# Patient Record
Sex: FEMALE | Race: BLACK OR AFRICAN AMERICAN | NOT HISPANIC OR LATINO | URBAN - METROPOLITAN AREA
[De-identification: names, ages, dates, MRNs, and addresses within clinical notes are randomized per-mention and may not be internally consistent; named-entity substitution may affect disease eponyms.]

---

## 2023-08-20 ENCOUNTER — INPATIENT (INPATIENT)
Facility: HOSPITAL | Age: 73
LOS: 4 days | Discharge: HOME | End: 2023-08-25
Attending: INTERNAL MEDICINE | Admitting: INTERNAL MEDICINE
Payer: MEDICAID

## 2023-08-20 VITALS
RESPIRATION RATE: 16 BRPM | TEMPERATURE: 98 F | DIASTOLIC BLOOD PRESSURE: 71 MMHG | HEART RATE: 110 BPM | SYSTOLIC BLOOD PRESSURE: 151 MMHG | OXYGEN SATURATION: 100 %

## 2023-08-20 LAB
ALBUMIN SERPL ELPH-MCNC: 4.7 G/DL — SIGNIFICANT CHANGE UP (ref 3.3–5)
ALP SERPL-CCNC: 138 U/L — HIGH (ref 40–120)
ALT FLD-CCNC: 32 U/L — SIGNIFICANT CHANGE UP (ref 4–33)
ANION GAP SERPL CALC-SCNC: 12 MMOL/L — SIGNIFICANT CHANGE UP (ref 7–14)
AST SERPL-CCNC: 36 U/L — HIGH (ref 4–32)
BASOPHILS # BLD AUTO: 0.03 K/UL — SIGNIFICANT CHANGE UP (ref 0–0.2)
BASOPHILS NFR BLD AUTO: 0.4 % — SIGNIFICANT CHANGE UP (ref 0–2)
BILIRUB SERPL-MCNC: 0.3 MG/DL — SIGNIFICANT CHANGE UP (ref 0.2–1.2)
BUN SERPL-MCNC: 18 MG/DL — SIGNIFICANT CHANGE UP (ref 7–23)
CALCIUM SERPL-MCNC: 10.6 MG/DL — HIGH (ref 8.4–10.5)
CHLORIDE SERPL-SCNC: 101 MMOL/L — SIGNIFICANT CHANGE UP (ref 98–107)
CO2 SERPL-SCNC: 28 MMOL/L — SIGNIFICANT CHANGE UP (ref 22–31)
CREAT SERPL-MCNC: 0.81 MG/DL — SIGNIFICANT CHANGE UP (ref 0.5–1.3)
EGFR: 76 ML/MIN/1.73M2 — SIGNIFICANT CHANGE UP
EOSINOPHIL # BLD AUTO: 0.03 K/UL — SIGNIFICANT CHANGE UP (ref 0–0.5)
EOSINOPHIL NFR BLD AUTO: 0.4 % — SIGNIFICANT CHANGE UP (ref 0–6)
GLUCOSE SERPL-MCNC: 140 MG/DL — HIGH (ref 70–99)
HCT VFR BLD CALC: 36.9 % — SIGNIFICANT CHANGE UP (ref 34.5–45)
HGB BLD-MCNC: 11.8 G/DL — SIGNIFICANT CHANGE UP (ref 11.5–15.5)
IANC: 4.06 K/UL — SIGNIFICANT CHANGE UP (ref 1.8–7.4)
IMM GRANULOCYTES NFR BLD AUTO: 0.3 % — SIGNIFICANT CHANGE UP (ref 0–0.9)
LIDOCAIN IGE QN: 32 U/L — SIGNIFICANT CHANGE UP (ref 7–60)
LYMPHOCYTES # BLD AUTO: 2.1 K/UL — SIGNIFICANT CHANGE UP (ref 1–3.3)
LYMPHOCYTES # BLD AUTO: 31.5 % — SIGNIFICANT CHANGE UP (ref 13–44)
MCHC RBC-ENTMCNC: 29.6 PG — SIGNIFICANT CHANGE UP (ref 27–34)
MCHC RBC-ENTMCNC: 32 GM/DL — SIGNIFICANT CHANGE UP (ref 32–36)
MCV RBC AUTO: 92.5 FL — SIGNIFICANT CHANGE UP (ref 80–100)
MONOCYTES # BLD AUTO: 0.43 K/UL — SIGNIFICANT CHANGE UP (ref 0–0.9)
MONOCYTES NFR BLD AUTO: 6.4 % — SIGNIFICANT CHANGE UP (ref 2–14)
NEUTROPHILS # BLD AUTO: 4.06 K/UL — SIGNIFICANT CHANGE UP (ref 1.8–7.4)
NEUTROPHILS NFR BLD AUTO: 61 % — SIGNIFICANT CHANGE UP (ref 43–77)
NRBC # BLD: 0 /100 WBCS — SIGNIFICANT CHANGE UP (ref 0–0)
NRBC # FLD: 0 K/UL — SIGNIFICANT CHANGE UP (ref 0–0)
PLATELET # BLD AUTO: 191 K/UL — SIGNIFICANT CHANGE UP (ref 150–400)
POTASSIUM SERPL-MCNC: 3.6 MMOL/L — SIGNIFICANT CHANGE UP (ref 3.5–5.3)
POTASSIUM SERPL-SCNC: 3.6 MMOL/L — SIGNIFICANT CHANGE UP (ref 3.5–5.3)
PROT SERPL-MCNC: 8.1 G/DL — SIGNIFICANT CHANGE UP (ref 6–8.3)
RBC # BLD: 3.99 M/UL — SIGNIFICANT CHANGE UP (ref 3.8–5.2)
RBC # FLD: 13.3 % — SIGNIFICANT CHANGE UP (ref 10.3–14.5)
SODIUM SERPL-SCNC: 141 MMOL/L — SIGNIFICANT CHANGE UP (ref 135–145)
WBC # BLD: 6.67 K/UL — SIGNIFICANT CHANGE UP (ref 3.8–10.5)
WBC # FLD AUTO: 6.67 K/UL — SIGNIFICANT CHANGE UP (ref 3.8–10.5)

## 2023-08-20 PROCEDURE — 99285 EMERGENCY DEPT VISIT HI MDM: CPT

## 2023-08-20 RX ORDER — ONDANSETRON 8 MG/1
4 TABLET, FILM COATED ORAL ONCE
Refills: 0 | Status: COMPLETED | OUTPATIENT
Start: 2023-08-20 | End: 2023-08-20

## 2023-08-20 RX ORDER — FAMOTIDINE 10 MG/ML
20 INJECTION INTRAVENOUS ONCE
Refills: 0 | Status: COMPLETED | OUTPATIENT
Start: 2023-08-20 | End: 2023-08-20

## 2023-08-20 RX ORDER — SODIUM CHLORIDE 9 MG/ML
1000 INJECTION INTRAMUSCULAR; INTRAVENOUS; SUBCUTANEOUS ONCE
Refills: 0 | Status: COMPLETED | OUTPATIENT
Start: 2023-08-20 | End: 2023-08-20

## 2023-08-20 RX ORDER — KETOROLAC TROMETHAMINE 30 MG/ML
15 SYRINGE (ML) INJECTION ONCE
Refills: 0 | Status: DISCONTINUED | OUTPATIENT
Start: 2023-08-20 | End: 2023-08-20

## 2023-08-20 RX ADMIN — FAMOTIDINE 20 MILLIGRAM(S): 10 INJECTION INTRAVENOUS at 21:41

## 2023-08-20 RX ADMIN — SODIUM CHLORIDE 1000 MILLILITER(S): 9 INJECTION INTRAMUSCULAR; INTRAVENOUS; SUBCUTANEOUS at 21:41

## 2023-08-20 RX ADMIN — Medication 30 MILLILITER(S): at 21:41

## 2023-08-20 RX ADMIN — Medication 15 MILLIGRAM(S): at 21:40

## 2023-08-20 RX ADMIN — ONDANSETRON 4 MILLIGRAM(S): 8 TABLET, FILM COATED ORAL at 21:41

## 2023-08-20 NOTE — ED PROVIDER NOTE - CLINICAL SUMMARY MEDICAL DECISION MAKING FREE TEXT BOX
73 y/o F with PMH of non-insulin DM, HTN, and HLD presents c/o 2 episodes of NBNB N/V today. Patient with tenderness on exam so will need to be ruled out for underlying abdominal pathology. Fingerstick on arrival is 200 so less likely symptoms are related to DKA but will reassess based on blood work. Labs, UA, EKG, CT abd/pelvis with contrast ordered. Fluids, toradol, zofran, pepcid, and maalox ordered as treatment. 73 y/o F with PMH of non-insulin DM, HTN, and HLD presents c/o 2 episodes of NBNB N/V today. Patient with tenderness on exam so will need to be ruled out for underlying abdominal pathology. Fingerstick on arrival is 200 so less likely symptoms are related to DKA but will reassess based on blood work. Labs, UA, EKG, CT abd/pelvis with contrast ordered. Fluids, toradol, zofran, pepcid, and maalox ordered as treatment.  Labs show change in troponin level from 15>9 which is a significant change that may indicate downtrending from acute cardiac event. Rest of labs wnl.  EKG was NS @ 77 without evidence of ST elevations or depression, no t-wave inversions, or other concerning ischemic signs.  CT was unremarkable for acute findings.  Patient feeling better throughout ED visit, but considering patients change in troponin, presenting incident, and her medical history, will admit to tele service for continued cardiac monitoring and care.  Discussed the case with the cardiology team and patient was accepted for admission under Dr. Lay.

## 2023-08-20 NOTE — ED ADULT NURSE NOTE - OBJECTIVE STATEMENT
Patient came in with the complaints of abdominal discomfort with nausea, vomiting and diarrhea. No other complaints. No distress noted. Specimens collected and sent. Medications given as ordered. Patient tolerated well. Nursing care continues

## 2023-08-20 NOTE — ED PROVIDER NOTE - ATTENDING APP SHARED VISIT CONTRIBUTION OF CARE
I performed a face-to-face evaluation of the patient and performed a history and physical examination along with the resident or ACP, and/or medical student above.  I agree with the history and physical examination as documented by the resident or ACP, and/or medical student above.  Sanchez:  CONSTITUTIONAL: Comfortable; in no acute distress. Non-toxic appearing.   NEURO: Alert & oriented. Sensory and motor functions are grossly intact.  PSYCH: Mood appropriate. Thought processes intact.   HEAD: NCAT  CARD: Regular rate and rhythm, no murmurs  RESP: No accessory muscle use; breath sounds clear and equal bilaterally; no wheezes, rhonchi, or rales     ABD: Soft; (+) RLQ tenderness to palpation; non-distended. No guarding or rebound.   MUSCULOSKELETAL/EXTREMITIES: FROM in all four extremities; no extremity edema.  SKIN: Warm; dry; no apparent lesions or exudate

## 2023-08-20 NOTE — ED PROVIDER NOTE - PHYSICAL EXAMINATION
CONSTITUTIONAL: Comfortable; in no acute distress. Non-toxic appearing.   NEURO: Alert & oriented. Sensory and motor functions are grossly intact.  PSYCH: Mood appropriate. Thought processes intact.   HEAD: NCAT  CARD: Regular rate and rhythm, no murmurs  RESP: No accessory muscle use; breath sounds clear and equal bilaterally; no wheezes, rhonchi, or rales     ABD: Soft; (+) RLQ tenderness to palpation; non-distended. No guarding or rebound.   MUSCULOSKELETAL/EXTREMITIES: FROM in all four extremities; no extremity edema.  SKIN: Warm; dry; no apparent lesions or exudate

## 2023-08-20 NOTE — ED PROVIDER NOTE - OBJECTIVE STATEMENT
73 y/o F with PMH of non-insulin DM, HTN, and HLD presents c/o 2 episodes of NBNB N/V today. Patient notes that for several months she was having issues with having a "bad feeling" in her abdomen that was a discomfort described more as nausea rather than overt pain. The discomfort was originally sporadic and would only last for a few minutes at a time with complete resolution after each incident so she did not pursue any medical evaluations or interventions for it prior to this visit. However, today the discomfort started at around 11am and has been constant since that time with associated 2 episodes of NBNB vomiting, lightheaded dizziness, shakiness, and pre-syncope prompting current visit. At time of exam patient endorses some improvement of of symptoms but slight residual discomfort. Of note, she did have her DM medications adjusted 3 weeks earlier with new medications added to her previous regimen but her sporadic symptoms were still going on before that point. Denies any other complaints or concerns. Denies chest pain, SOB, cough, fevers, chills, diarrhea, constipation, urinary complaints, HA, numbness, tingling, weakness, sick contacts, recent travel. 73 y/o F with PMH of non-insulin DM, HTN, and HLD presents c/o 2 episodes of NBNB N/V today. Patient notes that for several months she was having issues with having a "bad feeling" in her abdomen that was a discomfort described more as nausea rather than overt pain. The discomfort was originally sporadic and would only last for a few minutes at a time with complete resolution after each incident so she did not pursue any medical evaluations or interventions for it prior to this visit. However, today the discomfort started at around 11am and has been constant since that time with associated 2 episodes of NBNB vomiting, lightheaded dizziness, shakiness, and pre-syncope prompting current visit. At time of exam patient endorses some improvement of symptoms but slight residual discomfort. Of note, she did have her DM medications adjusted 3 weeks earlier with new medications added to her previous regimen but her sporadic symptoms were still going on before that point. Denies any other complaints or concerns. Denies chest pain, SOB, cough, fevers, chills, diarrhea, constipation, urinary complaints, HA, numbness, tingling, weakness, sick contacts, recent travel.

## 2023-08-20 NOTE — ED ADULT TRIAGE NOTE - CHIEF COMPLAINT QUOTE
Pt arrives via EMS from home c/o N/V since 11:00 AM. Endorses decreased appetite x1 yr. PMHx: DM, HTN.

## 2023-08-21 DIAGNOSIS — R10.13 EPIGASTRIC PAIN: ICD-10-CM

## 2023-08-21 LAB
ANION GAP SERPL CALC-SCNC: 13 MMOL/L — SIGNIFICANT CHANGE UP (ref 7–14)
APPEARANCE UR: CLEAR — SIGNIFICANT CHANGE UP
BACTERIA # UR AUTO: NEGATIVE /HPF — SIGNIFICANT CHANGE UP
BILIRUB UR-MCNC: NEGATIVE — SIGNIFICANT CHANGE UP
BUN SERPL-MCNC: 14 MG/DL — SIGNIFICANT CHANGE UP (ref 7–23)
CALCIUM SERPL-MCNC: 9.9 MG/DL — SIGNIFICANT CHANGE UP (ref 8.4–10.5)
CHLORIDE SERPL-SCNC: 101 MMOL/L — SIGNIFICANT CHANGE UP (ref 98–107)
CO2 SERPL-SCNC: 28 MMOL/L — SIGNIFICANT CHANGE UP (ref 22–31)
COLOR SPEC: YELLOW — SIGNIFICANT CHANGE UP
CREAT SERPL-MCNC: 0.79 MG/DL — SIGNIFICANT CHANGE UP (ref 0.5–1.3)
DIFF PNL FLD: NEGATIVE — SIGNIFICANT CHANGE UP
EGFR: 78 ML/MIN/1.73M2 — SIGNIFICANT CHANGE UP
GLUCOSE BLDC GLUCOMTR-MCNC: 128 MG/DL — HIGH (ref 70–99)
GLUCOSE BLDC GLUCOMTR-MCNC: 159 MG/DL — HIGH (ref 70–99)
GLUCOSE BLDC GLUCOMTR-MCNC: 61 MG/DL — LOW (ref 70–99)
GLUCOSE BLDC GLUCOMTR-MCNC: 76 MG/DL — SIGNIFICANT CHANGE UP (ref 70–99)
GLUCOSE BLDC GLUCOMTR-MCNC: 91 MG/DL — SIGNIFICANT CHANGE UP (ref 70–99)
GLUCOSE BLDC GLUCOMTR-MCNC: 96 MG/DL — SIGNIFICANT CHANGE UP (ref 70–99)
GLUCOSE BLDC GLUCOMTR-MCNC: 99 MG/DL — SIGNIFICANT CHANGE UP (ref 70–99)
GLUCOSE SERPL-MCNC: 145 MG/DL — HIGH (ref 70–99)
GLUCOSE UR QL: >=1000 MG/DL
HCT VFR BLD CALC: 37.5 % — SIGNIFICANT CHANGE UP (ref 34.5–45)
HGB BLD-MCNC: 11.6 G/DL — SIGNIFICANT CHANGE UP (ref 11.5–15.5)
KETONES UR-MCNC: NEGATIVE MG/DL — SIGNIFICANT CHANGE UP
LEUKOCYTE ESTERASE UR-ACNC: NEGATIVE — SIGNIFICANT CHANGE UP
MAGNESIUM SERPL-MCNC: 2.2 MG/DL — SIGNIFICANT CHANGE UP (ref 1.6–2.6)
MCHC RBC-ENTMCNC: 29.3 PG — SIGNIFICANT CHANGE UP (ref 27–34)
MCHC RBC-ENTMCNC: 30.9 GM/DL — LOW (ref 32–36)
MCV RBC AUTO: 94.7 FL — SIGNIFICANT CHANGE UP (ref 80–100)
NITRITE UR-MCNC: POSITIVE
NRBC # BLD: 0 /100 WBCS — SIGNIFICANT CHANGE UP (ref 0–0)
NRBC # FLD: 0 K/UL — SIGNIFICANT CHANGE UP (ref 0–0)
PH UR: 6.5 — SIGNIFICANT CHANGE UP (ref 5–8)
PHOSPHATE SERPL-MCNC: 3.4 MG/DL — SIGNIFICANT CHANGE UP (ref 2.5–4.5)
PLATELET # BLD AUTO: 188 K/UL — SIGNIFICANT CHANGE UP (ref 150–400)
POTASSIUM SERPL-MCNC: 3.9 MMOL/L — SIGNIFICANT CHANGE UP (ref 3.5–5.3)
POTASSIUM SERPL-SCNC: 3.9 MMOL/L — SIGNIFICANT CHANGE UP (ref 3.5–5.3)
PROT UR-MCNC: NEGATIVE MG/DL — SIGNIFICANT CHANGE UP
RBC # BLD: 3.96 M/UL — SIGNIFICANT CHANGE UP (ref 3.8–5.2)
RBC # FLD: 13.5 % — SIGNIFICANT CHANGE UP (ref 10.3–14.5)
RBC CASTS # UR COMP ASSIST: <5 /HPF — HIGH (ref 0–4)
SODIUM SERPL-SCNC: 142 MMOL/L — SIGNIFICANT CHANGE UP (ref 135–145)
SP GR SPEC: 1.02 — SIGNIFICANT CHANGE UP (ref 1–1.03)
TROPONIN T, HIGH SENSITIVITY RESULT: 9 NG/L — SIGNIFICANT CHANGE UP
UROBILINOGEN FLD QL: 1 MG/DL — SIGNIFICANT CHANGE UP (ref 0.2–1)
WBC # BLD: 5.28 K/UL — SIGNIFICANT CHANGE UP (ref 3.8–10.5)
WBC # FLD AUTO: 5.28 K/UL — SIGNIFICANT CHANGE UP (ref 3.8–10.5)
WBC UR QL: <5 /HPF — SIGNIFICANT CHANGE UP (ref 0–5)

## 2023-08-21 PROCEDURE — 74177 CT ABD & PELVIS W/CONTRAST: CPT | Mod: 26,MA

## 2023-08-21 RX ORDER — DEXTROSE 50 % IN WATER 50 %
12.5 SYRINGE (ML) INTRAVENOUS ONCE
Refills: 0 | Status: DISCONTINUED | OUTPATIENT
Start: 2023-08-21 | End: 2023-08-25

## 2023-08-21 RX ORDER — DEXTROSE 50 % IN WATER 50 %
25 SYRINGE (ML) INTRAVENOUS ONCE
Refills: 0 | Status: DISCONTINUED | OUTPATIENT
Start: 2023-08-21 | End: 2023-08-25

## 2023-08-21 RX ORDER — SODIUM CHLORIDE 9 MG/ML
1000 INJECTION, SOLUTION INTRAVENOUS
Refills: 0 | Status: DISCONTINUED | OUTPATIENT
Start: 2023-08-21 | End: 2023-08-25

## 2023-08-21 RX ORDER — INSULIN LISPRO 100/ML
VIAL (ML) SUBCUTANEOUS AT BEDTIME
Refills: 0 | Status: DISCONTINUED | OUTPATIENT
Start: 2023-08-21 | End: 2023-08-25

## 2023-08-21 RX ORDER — SODIUM CHLORIDE 9 MG/ML
1000 INJECTION INTRAMUSCULAR; INTRAVENOUS; SUBCUTANEOUS
Refills: 0 | Status: DISCONTINUED | OUTPATIENT
Start: 2023-08-21 | End: 2023-08-25

## 2023-08-21 RX ORDER — ENOXAPARIN SODIUM 100 MG/ML
40 INJECTION SUBCUTANEOUS EVERY 24 HOURS
Refills: 0 | Status: DISCONTINUED | OUTPATIENT
Start: 2023-08-21 | End: 2023-08-25

## 2023-08-21 RX ORDER — GLUCAGON INJECTION, SOLUTION 0.5 MG/.1ML
1 INJECTION, SOLUTION SUBCUTANEOUS ONCE
Refills: 0 | Status: DISCONTINUED | OUTPATIENT
Start: 2023-08-21 | End: 2023-08-25

## 2023-08-21 RX ORDER — INSULIN LISPRO 100/ML
VIAL (ML) SUBCUTANEOUS
Refills: 0 | Status: DISCONTINUED | OUTPATIENT
Start: 2023-08-21 | End: 2023-08-25

## 2023-08-21 RX ORDER — PANTOPRAZOLE SODIUM 20 MG/1
40 TABLET, DELAYED RELEASE ORAL
Refills: 0 | Status: DISCONTINUED | OUTPATIENT
Start: 2023-08-21 | End: 2023-08-25

## 2023-08-21 RX ORDER — DEXTROSE 50 % IN WATER 50 %
15 SYRINGE (ML) INTRAVENOUS ONCE
Refills: 0 | Status: DISCONTINUED | OUTPATIENT
Start: 2023-08-21 | End: 2023-08-25

## 2023-08-21 RX ADMIN — Medication 1: at 15:19

## 2023-08-21 RX ADMIN — SODIUM CHLORIDE 100 MILLILITER(S): 9 INJECTION INTRAMUSCULAR; INTRAVENOUS; SUBCUTANEOUS at 15:20

## 2023-08-21 RX ADMIN — ENOXAPARIN SODIUM 40 MILLIGRAM(S): 100 INJECTION SUBCUTANEOUS at 23:39

## 2023-08-21 NOTE — CONSULT NOTE ADULT - NS ATTEND AMEND GEN_ALL_CORE FT
Patient seen and examined. Agree with plan as detailed in PA/NP Note.     F/u TTE    Kelly Arroyo MD  Pager: [phone]

## 2023-08-21 NOTE — H&P ADULT - ASSESSMENT
71 y/o F with PMH of non-insulin DM, HTN, and HLD presents c/o 2 episodes of NBNB N/V today. Patient notes that for several months she was having issues with having a "bad feeling" in her abdomen that was a discomfort described more as nausea rather than overt pain. The discomfort was originally sporadic and would only last for a few minutes at a time with complete resolution after each incident so she did not pursue any medical evaluations or interventions for it prior to this visit. However, today the discomfort started at around 11am and has been constant since that time with associated 2 episodes of NBNB vomiting, lightheaded dizziness, shakiness, and pre-syncope prompting current visit. At time of exam patient endorses some improvement of of symptoms but slight residual discomfort. Of note, she did have her DM medications adjusted 3 weeks earlier with new medications added to her previous regimen but her sporadic symptoms were still going on before that point. Denies any other complaints or concerns. Denies chest pain, SOB, cough, fevers, chills, diarrhea, constipation, urinary complaints, HA, numbness, tingling, weakness, sick contacts, recent travel. nausea and vomiting   71 y/o F with PMH of non-insulin DM, HTN, and HLD presents c/o 2 episodes of NBNB N/V today. Patient notes that for several months she was having issues with having a "bad feeling" in her abdomen that was a discomfort described more as nausea rather than overt pain. The discomfort was originally sporadic and would only last for a few minutes at a time with complete resolution after each incident so she did not pursue any medical evaluations or interventions for it prior to this visit. However, today the discomfort started at around 11am and has been constant since that time with associated 2 episodes of NBNB vomiting, lightheaded dizziness, shakiness, and pre-syncope prompting current visit. At time of exam patient endorses some improvement of of symptoms but slight residual discomfort. Of note, she did have her DM medications adjusted 3 weeks earlier with new medications added to her previous regimen but her sporadic symptoms were still going on before that point. Denies any other complaints or concerns. Denies chest pain, SOB, cough, fevers, chills, diarrhea, constipation, urinary complaints, HA, numbness, tingling, weakness, sick contacts, recent travel. nausea and vomiting     1 Chest pain  - telemonitor  - cards fu   - ro ACS  - Ischemia casey as per cards    2 Nausea and vomitng, abdominal pain  - cw protonix  - CT neg  - will monitor    3 DM  - monitor FS  - ISS    PT UNSURE OF HOME MEDS  family will bring the meds later today

## 2023-08-21 NOTE — H&P ADULT - NSHPLABSRESULTS_GEN_ALL_CORE
Lab Results:  CBC  CBC Full  -  ( 20 Aug 2023 22:14 )  WBC Count : 6.67 K/uL  RBC Count : 3.99 M/uL  Hemoglobin : 11.8 g/dL  Hematocrit : 36.9 %  Platelet Count - Automated : 191 K/uL  Mean Cell Volume : 92.5 fL  Mean Cell Hemoglobin : 29.6 pg  Mean Cell Hemoglobin Concentration : 32.0 gm/dL  Auto Neutrophil # : 4.06 K/uL  Auto Lymphocyte # : 2.10 K/uL  Auto Monocyte # : 0.43 K/uL  Auto Eosinophil # : 0.03 K/uL  Auto Basophil # : 0.03 K/uL  Auto Neutrophil % : 61.0 %  Auto Lymphocyte % : 31.5 %  Auto Monocyte % : 6.4 %  Auto Eosinophil % : 0.4 %  Auto Basophil % : 0.4 %    .		Differential:	[] Automated		[] Manual  Chemistry                        11.8   6.67  )-----------( 191      ( 20 Aug 2023 22:14 )             36.9     08-20    141  |  101  |  18  ----------------------------<  140<H>  3.6   |  28  |  0.81    Ca    10.6<H>      20 Aug 2023 22:14    TPro  8.1  /  Alb  4.7  /  TBili  0.3  /  DBili  x   /  AST  36<H>  /  ALT  32  /  AlkPhos  138<H>  08-20    LIVER FUNCTIONS - ( 20 Aug 2023 22:14 )  Alb: 4.7 g/dL / Pro: 8.1 g/dL / ALK PHOS: 138 U/L / ALT: 32 U/L / AST: 36 U/L / GGT: x             Urinalysis Basic - ( 21 Aug 2023 01:06 )    Color: Yellow / Appearance: Clear / S.025 / pH: x  Gluc: x / Ketone: Negative mg/dL  / Bili: Negative / Urobili: 1.0 mg/dL   Blood: x / Protein: Negative mg/dL / Nitrite: Positive   Leuk Esterase: Negative / RBC: <5 /HPF / WBC <5 /HPF   Sq Epi: x / Non Sq Epi: x / Bacteria: Negative /HPF            MICROBIOLOGY/CULTURES:      RADIOLOGY RESULTS: reviewed

## 2023-08-21 NOTE — ED ADULT NURSE REASSESSMENT NOTE - NS ED NURSE REASSESS COMMENT FT1
Pt received from [facility]. Pt resting on stretcher. NAD noted. Pending bed placement. Frequent monitoring in place.
Pt a&ox4, NAD, Denies CP, SOB, dyspnea, or pain at this time. Respirations are even and unlabored, no signs of respiratory distress.

## 2023-08-21 NOTE — H&P ADULT - HISTORY OF PRESENT ILLNESS
71 y/o F with PMH of non-insulin DM, HTN, and HLD presents c/o 2 episodes of NBNB N/V today. Patient notes that for several months she was having issues with having a "bad feeling" in her abdomen that was a discomfort described more as nausea rather than overt pain. The discomfort was originally sporadic and would only last for a few minutes at a time with complete resolution after each incident so she did not pursue any medical evaluations or interventions for it prior to this visit. However, today the discomfort started at around 11am and has been constant since that time with associated 2 episodes of NBNB vomiting, lightheaded dizziness, shakiness, and pre-syncope prompting current visit. At time of exam patient endorses some improvement of of symptoms but slight residual discomfort. Of note, she did have her DM medications adjusted 3 weeks earlier with new medications added to her previous regimen but her sporadic symptoms were still going on before that point. Denies any other complaints or concerns. Denies chest pain, SOB, cough, fevers, chills, diarrhea, constipation, urinary complaints, HA, numbness, tingling, weakness, sick contacts, recent travel. nausea and vomiting

## 2023-08-21 NOTE — H&P ADULT - NSHPPHYSICALEXAM_GEN_ALL_CORE
General: obese  Neurology: A&Ox3, nonfocal, FRANCO x 4  Respiratory: CTA B/L  CV: RRR, S1S2, no murmurs, rubs or gallops  Abdominal: Soft, NT, ND +BS, Last BM  Extremities: No edema, + peripheral pulses  Skin Normal

## 2023-08-22 LAB
ANION GAP SERPL CALC-SCNC: 12 MMOL/L — SIGNIFICANT CHANGE UP (ref 7–14)
BUN SERPL-MCNC: 17 MG/DL — SIGNIFICANT CHANGE UP (ref 7–23)
CALCIUM SERPL-MCNC: 9.1 MG/DL — SIGNIFICANT CHANGE UP (ref 8.4–10.5)
CHLORIDE SERPL-SCNC: 104 MMOL/L — SIGNIFICANT CHANGE UP (ref 98–107)
CO2 SERPL-SCNC: 23 MMOL/L — SIGNIFICANT CHANGE UP (ref 22–31)
CREAT SERPL-MCNC: 0.75 MG/DL — SIGNIFICANT CHANGE UP (ref 0.5–1.3)
EGFR: 83 ML/MIN/1.73M2 — SIGNIFICANT CHANGE UP
GLUCOSE BLDC GLUCOMTR-MCNC: 110 MG/DL — HIGH (ref 70–99)
GLUCOSE BLDC GLUCOMTR-MCNC: 125 MG/DL — HIGH (ref 70–99)
GLUCOSE BLDC GLUCOMTR-MCNC: 132 MG/DL — HIGH (ref 70–99)
GLUCOSE BLDC GLUCOMTR-MCNC: 135 MG/DL — HIGH (ref 70–99)
GLUCOSE BLDC GLUCOMTR-MCNC: 141 MG/DL — HIGH (ref 70–99)
GLUCOSE SERPL-MCNC: 142 MG/DL — HIGH (ref 70–99)
HCT VFR BLD CALC: 34.7 % — SIGNIFICANT CHANGE UP (ref 34.5–45)
HCV AB S/CO SERPL IA: 0.06 S/CO — SIGNIFICANT CHANGE UP (ref 0–0.99)
HCV AB SERPL-IMP: SIGNIFICANT CHANGE UP
HGB BLD-MCNC: 10.9 G/DL — LOW (ref 11.5–15.5)
MCHC RBC-ENTMCNC: 29.2 PG — SIGNIFICANT CHANGE UP (ref 27–34)
MCHC RBC-ENTMCNC: 31.4 GM/DL — LOW (ref 32–36)
MCV RBC AUTO: 93 FL — SIGNIFICANT CHANGE UP (ref 80–100)
NRBC # BLD: 0 /100 WBCS — SIGNIFICANT CHANGE UP (ref 0–0)
NRBC # FLD: 0 K/UL — SIGNIFICANT CHANGE UP (ref 0–0)
PLATELET # BLD AUTO: 167 K/UL — SIGNIFICANT CHANGE UP (ref 150–400)
POTASSIUM SERPL-MCNC: 3.7 MMOL/L — SIGNIFICANT CHANGE UP (ref 3.5–5.3)
POTASSIUM SERPL-SCNC: 3.7 MMOL/L — SIGNIFICANT CHANGE UP (ref 3.5–5.3)
RBC # BLD: 3.73 M/UL — LOW (ref 3.8–5.2)
RBC # FLD: 13.5 % — SIGNIFICANT CHANGE UP (ref 10.3–14.5)
SODIUM SERPL-SCNC: 139 MMOL/L — SIGNIFICANT CHANGE UP (ref 135–145)
WBC # BLD: 4.43 K/UL — SIGNIFICANT CHANGE UP (ref 3.8–10.5)
WBC # FLD AUTO: 4.43 K/UL — SIGNIFICANT CHANGE UP (ref 3.8–10.5)

## 2023-08-22 PROCEDURE — 93306 TTE W/DOPPLER COMPLETE: CPT | Mod: 26

## 2023-08-22 RX ORDER — METFORMIN HYDROCHLORIDE 850 MG/1
1 TABLET ORAL
Refills: 0 | DISCHARGE

## 2023-08-22 RX ORDER — ROSUVASTATIN CALCIUM 5 MG/1
1 TABLET ORAL
Refills: 0 | DISCHARGE

## 2023-08-22 RX ORDER — POLYETHYLENE GLYCOL 3350 17 G/17G
17 POWDER, FOR SOLUTION ORAL DAILY
Refills: 0 | Status: DISCONTINUED | OUTPATIENT
Start: 2023-08-22 | End: 2023-08-22

## 2023-08-22 RX ORDER — POLYETHYLENE GLYCOL 3350 17 G/17G
17 POWDER, FOR SOLUTION ORAL
Refills: 0 | Status: DISCONTINUED | OUTPATIENT
Start: 2023-08-22 | End: 2023-08-25

## 2023-08-22 RX ORDER — SENNA PLUS 8.6 MG/1
2 TABLET ORAL AT BEDTIME
Refills: 0 | Status: DISCONTINUED | OUTPATIENT
Start: 2023-08-22 | End: 2023-08-25

## 2023-08-22 RX ORDER — LEVOCETIRIZINE DIHYDROCHLORIDE 0.5 MG/ML
1 SOLUTION ORAL
Refills: 0 | DISCHARGE

## 2023-08-22 RX ORDER — AMLODIPINE BESYLATE 2.5 MG/1
1 TABLET ORAL
Refills: 0 | DISCHARGE

## 2023-08-22 RX ADMIN — SENNA PLUS 2 TABLET(S): 8.6 TABLET ORAL at 22:30

## 2023-08-22 RX ADMIN — POLYETHYLENE GLYCOL 3350 17 GRAM(S): 17 POWDER, FOR SOLUTION ORAL at 18:03

## 2023-08-22 RX ADMIN — PANTOPRAZOLE SODIUM 40 MILLIGRAM(S): 20 TABLET, DELAYED RELEASE ORAL at 05:14

## 2023-08-22 RX ADMIN — POLYETHYLENE GLYCOL 3350 17 GRAM(S): 17 POWDER, FOR SOLUTION ORAL at 11:57

## 2023-08-22 RX ADMIN — ENOXAPARIN SODIUM 40 MILLIGRAM(S): 100 INJECTION SUBCUTANEOUS at 22:31

## 2023-08-22 NOTE — PROGRESS NOTE ADULT - SUBJECTIVE AND OBJECTIVE BOX
Date of service 8/22/23    chief complaint: abdominal pain    extended hpi: 72 year old Female with PMH of non-insulin DM, HTN, and HLD presents c/o 2 episodes of NBNB N/V today. She reports DM meds were adjusted recently and 3 pills were added, unclear of op meds.  Reports epigastric pain, reproducible.     complains of abdominal pain and nausea, no chest pain or SOB    Review of Systems:   Constitutional: [ ] fevers, [ ] chills.   Skin: [ ] dry skin. [ ] rashes.  Psychiatric: [ ] depression, [ ] anxiety.   Gastrointestinal: [ ] BRBPR, [ ] melena.   Neurological: [ ] confusion. [ ] seizures. [ ] shuffling gait.   Ears,Nose,Mouth and Throat: [ ] ear pain [ ] sore throat.   Eyes: [ ] diplopia.   Respiratory: [ ] hemoptysis. [ ] shortness of breath  Cardiovascular: See HPI above  Hematologic/Lymphatic: [ ] anemia. [ ] painful nodes. [ ] prolonged bleeding.   Genitourinary: [ ] hematuria. [ ] flank pain.   Endocrine: [ ] significant change in weight. [ ] intolerance to heat and cold.     Review of systems [x ] otherwise negative, [ ] otherwise unable to obtain    FH: no family history of sudden cardiac death in first degree relatives    SH: [ ] tobacco, [ ] alcohol, [ ] drugs    enoxaparin Injectable 40 milliGRAM(s) SubCutaneous every 24 hours  glucagon  Injectable 1 milliGRAM(s) IntraMuscular once  insulin lispro (ADMELOG) corrective regimen sliding scale   SubCutaneous three times a day before meals  insulin lispro (ADMELOG) corrective regimen sliding scale   SubCutaneous at bedtime  pantoprazole    Tablet 40 milliGRAM(s) Oral before breakfast  polyethylene glycol 3350 17 Gram(s) Oral two times a day  senna 2 Tablet(s) Oral at bedtime  sodium chloride 0.9%. 1000 milliLiter(s) IV Continuous <Continuous>                            10.9   4.43  )-----------( 167      ( 22 Aug 2023 05:53 )             34.7       139  |  104  |  17  ----------------------------<  142<H>  3.7   |  23  |  0.75    Ca    9.1      22 Aug 2023 05:53  Phos  3.4     08-21  Mg     2.20     08-21    TPro  8.1  /  Alb  4.7  /  TBili  0.3  /  DBili  x   /  AST  36<H>  /  ALT  32  /  AlkPhos  138<H>  08-20      T(C): 37 (08-22-23 @ 12:31), Max: 37 (08-22-23 @ 12:31)  HR: 82 (08-22-23 @ 12:31) (74 - 82)  BP: 127/88 (08-22-23 @ 12:31) (120/58 - 154/68)  RR: 18 (08-22-23 @ 12:31) (16 - 18)  SpO2: 99% (08-22-23 @ 12:31) (99% - 100%)  Wt(kg): --    General: Well nourished in no acute distress. Alert and Oriented * 3.   Head: Normocephalic and atraumatic.   Neck: No JVD. No bruits. Supple. Does not appear to be enlarged.   Cardiovascular: + S1,S2 ; RRR Soft systolic murmur at the left lower sternal border. No rubs noted.    Lungs: CTA b/l. No rhonchi, rales or wheezes.   Abdomen: + BS, soft. Non tender. Non distended. No rebound. No guarding.   Extremities: No clubbing/cyanosis/edema.   Neurologic: Moves all four extremities. Full range of motion.   Skin: Warm and moist. The patient's skin has normal elasticity and good skin turgor.   Psychiatric: Appropriate mood and affect.  Musculoskeletal: Normal range of motion, normal strength    DATA    tele- SR    ECG:  	NSR    < from: CT Abdomen and Pelvis w/ IV Cont (08.21.23 @ 03:38) >    IMPRESSION:  No acute CT abnormality.    < end of copied text >      ASSESSMENT/PLAN: 	72 year old Female with PMH of non-insulin DM, HTN, and HLD presents c/o 2 episodes of NBNB N/V today. She reports DM meds were adjusted recently and 3 pills were added, unclear of op meds.  Reports epigastric pain, reproducible.     1. Abnormal troponin T  --Trop T 15--9, not c/w ACS  --check TTE    2. Abdominal pain  --CT negative as above  --w/u per medicine    3. DM  --per medicine      Chante ESPINOSA  [phone]

## 2023-08-22 NOTE — PROGRESS NOTE ADULT - ASSESSMENT
71 y/o F with PMH of non-insulin DM, HTN, and HLD presents c/o 2 episodes of NBNB N/V today. Patient notes that for several months she was having issues with having a "bad feeling" in her abdomen that was a discomfort described more as nausea rather than overt pain. The discomfort was originally sporadic and would only last for a few minutes at a time with complete resolution after each incident so she did not pursue any medical evaluations or interventions for it prior to this visit. However, today the discomfort started at around 11am and has been constant since that time with associated 2 episodes of NBNB vomiting, lightheaded dizziness, shakiness, and pre-syncope prompting current visit. At time of exam patient endorses some improvement of of symptoms but slight residual discomfort. Of note, she did have her DM medications adjusted 3 weeks earlier with new medications added to her previous regimen but her sporadic symptoms were still going on before that point. Denies any other complaints or concerns. Denies chest pain, SOB, cough, fevers, chills, diarrhea, constipation, urinary complaints, HA, numbness, tingling, weakness, sick contacts, recent travel. nausea and vomiting     1 Chest pain  - telemonitor  - cards fu   - ro ACS  - Ischemia casey as per cards    2 Nausea and vomitng, abdominal pain  - cw protonix  - CT neg  - will monitor    3 DM  - monitor FS  - ISS    pharmacy called for med rec

## 2023-08-22 NOTE — PATIENT PROFILE ADULT - FALL HARM RISK - HARM RISK INTERVENTIONS
Assistance with ambulation/Assistance OOB with selected safe patient handling equipment/Communicate Risk of Fall with Harm to all staff/Monitor gait and stability/Reinforce activity limits and safety measures with patient and family/Sit up slowly, dangle for a short time, stand at bedside before walking/Tailored Fall Risk Interventions/Visual Cue: Yellow wristband and red socks/Bed in lowest position, wheels locked, appropriate side rails in place/Call bell, personal items and telephone in reach/Instruct patient to call for assistance before getting out of bed or chair/Non-slip footwear when patient is out of bed/[place] to call system/Physically safe environment - no spills, clutter or unnecessary equipment/Purposeful Proactive Rounding/Room/bathroom lighting operational, light cord in reach

## 2023-08-23 DIAGNOSIS — E11.9 TYPE 2 DIABETES MELLITUS WITHOUT COMPLICATIONS: ICD-10-CM

## 2023-08-23 DIAGNOSIS — E78.5 HYPERLIPIDEMIA, UNSPECIFIED: ICD-10-CM

## 2023-08-23 DIAGNOSIS — I10 ESSENTIAL (PRIMARY) HYPERTENSION: ICD-10-CM

## 2023-08-23 LAB
A1C WITH ESTIMATED AVERAGE GLUCOSE RESULT: 9.3 % — HIGH (ref 4–5.6)
AMYLASE P1 CFR SERPL: 126 U/L — HIGH (ref 25–125)
ANION GAP SERPL CALC-SCNC: 12 MMOL/L — SIGNIFICANT CHANGE UP (ref 7–14)
BUN SERPL-MCNC: 20 MG/DL — SIGNIFICANT CHANGE UP (ref 7–23)
CALCIUM SERPL-MCNC: 9.4 MG/DL — SIGNIFICANT CHANGE UP (ref 8.4–10.5)
CHLORIDE SERPL-SCNC: 104 MMOL/L — SIGNIFICANT CHANGE UP (ref 98–107)
CO2 SERPL-SCNC: 24 MMOL/L — SIGNIFICANT CHANGE UP (ref 22–31)
CREAT SERPL-MCNC: 0.74 MG/DL — SIGNIFICANT CHANGE UP (ref 0.5–1.3)
EC EAEA GENE STL QL NAA+PROBE: DETECTED
EGFR: 85 ML/MIN/1.73M2 — SIGNIFICANT CHANGE UP
ESTIMATED AVERAGE GLUCOSE: 220 — SIGNIFICANT CHANGE UP
GI PCR PANEL: DETECTED
GLUCOSE BLDC GLUCOMTR-MCNC: 112 MG/DL — HIGH (ref 70–99)
GLUCOSE BLDC GLUCOMTR-MCNC: 144 MG/DL — HIGH (ref 70–99)
GLUCOSE BLDC GLUCOMTR-MCNC: 251 MG/DL — HIGH (ref 70–99)
GLUCOSE BLDC GLUCOMTR-MCNC: 98 MG/DL — SIGNIFICANT CHANGE UP (ref 70–99)
GLUCOSE SERPL-MCNC: 128 MG/DL — HIGH (ref 70–99)
HCT VFR BLD CALC: 35.6 % — SIGNIFICANT CHANGE UP (ref 34.5–45)
HGB BLD-MCNC: 11.2 G/DL — LOW (ref 11.5–15.5)
LIDOCAIN IGE QN: 39 U/L — SIGNIFICANT CHANGE UP (ref 7–60)
MAGNESIUM SERPL-MCNC: 2.2 MG/DL — SIGNIFICANT CHANGE UP (ref 1.6–2.6)
MCHC RBC-ENTMCNC: 29.4 PG — SIGNIFICANT CHANGE UP (ref 27–34)
MCHC RBC-ENTMCNC: 31.5 GM/DL — LOW (ref 32–36)
MCV RBC AUTO: 93.4 FL — SIGNIFICANT CHANGE UP (ref 80–100)
NRBC # BLD: 0 /100 WBCS — SIGNIFICANT CHANGE UP (ref 0–0)
NRBC # FLD: 0 K/UL — SIGNIFICANT CHANGE UP (ref 0–0)
PHOSPHATE SERPL-MCNC: 3.4 MG/DL — SIGNIFICANT CHANGE UP (ref 2.5–4.5)
PLATELET # BLD AUTO: 174 K/UL — SIGNIFICANT CHANGE UP (ref 150–400)
POTASSIUM SERPL-MCNC: 3.8 MMOL/L — SIGNIFICANT CHANGE UP (ref 3.5–5.3)
POTASSIUM SERPL-SCNC: 3.8 MMOL/L — SIGNIFICANT CHANGE UP (ref 3.5–5.3)
RBC # BLD: 3.81 M/UL — SIGNIFICANT CHANGE UP (ref 3.8–5.2)
RBC # FLD: 13.3 % — SIGNIFICANT CHANGE UP (ref 10.3–14.5)
SODIUM SERPL-SCNC: 140 MMOL/L — SIGNIFICANT CHANGE UP (ref 135–145)
WBC # BLD: 4.85 K/UL — SIGNIFICANT CHANGE UP (ref 3.8–10.5)
WBC # FLD AUTO: 4.85 K/UL — SIGNIFICANT CHANGE UP (ref 3.8–10.5)

## 2023-08-23 PROCEDURE — 99222 1ST HOSP IP/OBS MODERATE 55: CPT

## 2023-08-23 PROCEDURE — 93010 ELECTROCARDIOGRAM REPORT: CPT

## 2023-08-23 RX ORDER — AMLODIPINE BESYLATE 2.5 MG/1
5 TABLET ORAL DAILY
Refills: 0 | Status: DISCONTINUED | OUTPATIENT
Start: 2023-08-23 | End: 2023-08-25

## 2023-08-23 RX ORDER — LISINOPRIL 2.5 MG/1
20 TABLET ORAL DAILY
Refills: 0 | Status: DISCONTINUED | OUTPATIENT
Start: 2023-08-23 | End: 2023-08-25

## 2023-08-23 RX ORDER — ATORVASTATIN CALCIUM 80 MG/1
20 TABLET, FILM COATED ORAL AT BEDTIME
Refills: 0 | Status: DISCONTINUED | OUTPATIENT
Start: 2023-08-23 | End: 2023-08-25

## 2023-08-23 RX ADMIN — AMLODIPINE BESYLATE 5 MILLIGRAM(S): 2.5 TABLET ORAL at 12:38

## 2023-08-23 RX ADMIN — PANTOPRAZOLE SODIUM 40 MILLIGRAM(S): 20 TABLET, DELAYED RELEASE ORAL at 05:49

## 2023-08-23 RX ADMIN — ATORVASTATIN CALCIUM 20 MILLIGRAM(S): 80 TABLET, FILM COATED ORAL at 21:57

## 2023-08-23 RX ADMIN — LISINOPRIL 20 MILLIGRAM(S): 2.5 TABLET ORAL at 12:38

## 2023-08-23 RX ADMIN — Medication 1: at 21:57

## 2023-08-23 RX ADMIN — ENOXAPARIN SODIUM 40 MILLIGRAM(S): 100 INJECTION SUBCUTANEOUS at 23:03

## 2023-08-23 RX ADMIN — POLYETHYLENE GLYCOL 3350 17 GRAM(S): 17 POWDER, FOR SOLUTION ORAL at 05:49

## 2023-08-23 RX ADMIN — POLYETHYLENE GLYCOL 3350 17 GRAM(S): 17 POWDER, FOR SOLUTION ORAL at 17:36

## 2023-08-23 NOTE — PROGRESS NOTE ADULT - SUBJECTIVE AND OBJECTIVE BOX
Patient is a 72y old  Female who presents with a chief complaint of   Date of servie : 08-23-23 @ 15:03  INTERVAL HPI/OVERNIGHT EVENTS:  T(C): 36.7 (08-23-23 @ 12:09), Max: 37.2 (08-22-23 @ 17:03)  HR: 89 (08-23-23 @ 12:09) (75 - 95)  BP: 154/80 (08-23-23 @ 12:09) (127/41 - 154/80)  RR: 18 (08-23-23 @ 12:09) (17 - 18)  SpO2: 100% (08-23-23 @ 12:09) (98% - 100%)  Wt(kg): --  I&O's Summary    22 Aug 2023 07:01  -  23 Aug 2023 07:00  --------------------------------------------------------  IN: 600 mL / OUT: 550 mL / NET: 50 mL        LABS:                        11.2   4.85  )-----------( 174      ( 23 Aug 2023 05:22 )             35.6     08-23    140  |  104  |  20  ----------------------------<  128<H>  3.8   |  24  |  0.74    Ca    9.4      23 Aug 2023 05:22  Phos  3.4     08-23  Mg     2.20     08-23        Urinalysis Basic - ( 23 Aug 2023 05:22 )    Color: x / Appearance: x / SG: x / pH: x  Gluc: 128 mg/dL / Ketone: x  / Bili: x / Urobili: x   Blood: x / Protein: x / Nitrite: x   Leuk Esterase: x / RBC: x / WBC x   Sq Epi: x / Non Sq Epi: x / Bacteria: x      CAPILLARY BLOOD GLUCOSE      POCT Blood Glucose.: 144 mg/dL (23 Aug 2023 11:54)  POCT Blood Glucose.: 112 mg/dL (23 Aug 2023 08:01)  POCT Blood Glucose.: 141 mg/dL (22 Aug 2023 22:09)  POCT Blood Glucose.: 110 mg/dL (22 Aug 2023 16:59)        Urinalysis Basic - ( 23 Aug 2023 05:22 )    Color: x / Appearance: x / SG: x / pH: x  Gluc: 128 mg/dL / Ketone: x  / Bili: x / Urobili: x   Blood: x / Protein: x / Nitrite: x   Leuk Esterase: x / RBC: x / WBC x   Sq Epi: x / Non Sq Epi: x / Bacteria: x        MEDICATIONS  (STANDING):  amLODIPine   Tablet 5 milliGRAM(s) Oral daily  atorvastatin 20 milliGRAM(s) Oral at bedtime  dextrose 5%. 1000 milliLiter(s) (100 mL/Hr) IV Continuous <Continuous>  dextrose 5%. 1000 milliLiter(s) (50 mL/Hr) IV Continuous <Continuous>  dextrose 50% Injectable 25 Gram(s) IV Push once  dextrose 50% Injectable 25 Gram(s) IV Push once  dextrose 50% Injectable 12.5 Gram(s) IV Push once  enoxaparin Injectable 40 milliGRAM(s) SubCutaneous every 24 hours  glucagon  Injectable 1 milliGRAM(s) IntraMuscular once  insulin lispro (ADMELOG) corrective regimen sliding scale   SubCutaneous at bedtime  insulin lispro (ADMELOG) corrective regimen sliding scale   SubCutaneous three times a day before meals  lisinopril 20 milliGRAM(s) Oral daily  pantoprazole    Tablet 40 milliGRAM(s) Oral before breakfast  polyethylene glycol 3350 17 Gram(s) Oral two times a day  senna 2 Tablet(s) Oral at bedtime  sodium chloride 0.9%. 1000 milliLiter(s) (100 mL/Hr) IV Continuous <Continuous>    MEDICATIONS  (PRN):  dextrose Oral Gel 15 Gram(s) Oral once PRN Blood Glucose LESS THAN 70 milliGRAM(s)/deciliter          PHYSICAL EXAM:  GENERAL: NAD, well-groomed, well-developed  HEAD:  Atraumatic, Normocephalic  CHEST/LUNG: Clear to percussion bilaterally; No rales, rhonchi, wheezing, or rubs  HEART: Regular rate and rhythm; No murmurs, rubs, or gallops  ABDOMEN: Soft, Nontender, Nondistended; Bowel sounds present  EXTREMITIES:  2+ Peripheral Pulses, No clubbing, cyanosis, or edema  LYMPH: No lymphadenopathy noted  SKIN: No rashes or lesions    Care Discussed with Consultants/Other Providers [ ] YES  [ ] NO

## 2023-08-23 NOTE — PROGRESS NOTE ADULT - SUBJECTIVE AND OBJECTIVE BOX
Date of service 8/23/23    chief complaint: abdominal pain    extended hpi: 72 year old Female with PMH of non-insulin DM, HTN, and HLD presents c/o 2 episodes of NBNB N/V today. She reports DM meds were adjusted recently and 3 pills were added, unclear of op meds.  Reports epigastric pain, reproducible.     complains of abdominal pain and nausea, no chest pain or SOB    Review of Systems:   Constitutional: [ ] fevers, [ ] chills.   Skin: [ ] dry skin. [ ] rashes.  Psychiatric: [ ] depression, [ ] anxiety.   Gastrointestinal: [ ] BRBPR, [ ] melena.   Neurological: [ ] confusion. [ ] seizures. [ ] shuffling gait.   Ears,Nose,Mouth and Throat: [ ] ear pain [ ] sore throat.   Eyes: [ ] diplopia.   Respiratory: [ ] hemoptysis. [ ] shortness of breath  Cardiovascular: See HPI above  Hematologic/Lymphatic: [ ] anemia. [ ] painful nodes. [ ] prolonged bleeding.   Genitourinary: [ ] hematuria. [ ] flank pain.   Endocrine: [ ] significant change in weight. [ ] intolerance to heat and cold.     Review of systems [ x] otherwise negative, [ ] otherwise unable to obtain    FH: no family history of sudden cardiac death in first degree relatives    SH: [ ] tobacco, [ ] alcohol, [ ] drugs    amLODIPine   Tablet 5 milliGRAM(s) Oral daily  atorvastatin 20 milliGRAM(s) Oral at bedtime  enoxaparin Injectable 40 milliGRAM(s) SubCutaneous every 24 hours  glucagon  Injectable 1 milliGRAM(s) IntraMuscular once  insulin lispro (ADMELOG) corrective regimen sliding scale   SubCutaneous at bedtime  insulin lispro (ADMELOG) corrective regimen sliding scale   SubCutaneous three times a day before meals  lisinopril 20 milliGRAM(s) Oral daily  pantoprazole    Tablet 40 milliGRAM(s) Oral before breakfast  polyethylene glycol 3350 17 Gram(s) Oral two times a day  senna 2 Tablet(s) Oral at bedtime  sodium chloride 0.9%. 1000 milliLiter(s) IV Continuous <Continuous>                     11.2   4.85  )-----------( 174      ( 23 Aug 2023 05:22 )             35.6       140  |  104  |  20  ----------------------------<  128<H>  3.8   |  24  |  0.74    Ca    9.4      23 Aug 2023 05:22  Phos  3.4     08-23  Mg     2.20     08-23      T(C): 36.7 (08-23-23 @ 12:09), Max: 37.2 (08-22-23 @ 17:03)  HR: 89 (08-23-23 @ 12:09) (75 - 95)  BP: 154/80 (08-23-23 @ 12:09) (127/41 - 154/80)  RR: 18 (08-23-23 @ 12:09) (17 - 18)  SpO2: 100% (08-23-23 @ 12:09) (98% - 100%)  Wt(kg): --    I&O's Summary    22 Aug 2023 07:01  -  23 Aug 2023 07:00  --------------------------------------------------------  IN: 600 mL / OUT: 550 mL / NET: 50 mL      General: Well nourished in no acute distress. Alert and Oriented * 3.   Head: Normocephalic and atraumatic.   Neck: No JVD. No bruits. Supple. Does not appear to be enlarged.   Cardiovascular: + S1,S2 ; RRR Soft systolic murmur at the left lower sternal border. No rubs noted.    Lungs: CTA b/l. No rhonchi, rales or wheezes.   Abdomen: + BS, soft. Non tender. Non distended. No rebound. No guarding.   Extremities: No clubbing/cyanosis/edema.   Neurologic: Moves all four extremities. Full range of motion.   Skin: Warm and moist. The patient's skin has normal elasticity and good skin turgor.   Psychiatric: Appropriate mood and affect.  Musculoskeletal: Normal range of motion, normal strength    DATA    tele- SR    ECG:  	NSR    < from: CT Abdomen and Pelvis w/ IV Cont (08.21.23 @ 03:38) >    IMPRESSION:  No acute CT abnormality.    < end of copied text >      ASSESSMENT/PLAN: 	72 year old Female with PMH of non-insulin DM, HTN, and HLD presents c/o 2 episodes of NBNB N/V today. She reports DM meds were adjusted recently and 3 pills were added, unclear of op meds.  Reports epigastric pain, reproducible.     1. Abnormal troponin T  --Trop T 15--9, not c/w ACS  --TTE with structurally normal heart  --no further work up planned    2. Abdominal pain  --CT negative as above  --pain resolved after having a BM  --Rx per medicine    3. DM  --per medicine    OP F/U with Cardio Clinic after DC    Chante ESPINOSA  [phone]                  Date of service 8/23/23    chief complaint: abdominal pain    extended hpi: 72 year old Female with PMH of non-insulin DM, HTN, and HLD presents c/o 2 episodes of NBNB N/V today. She reports DM meds were adjusted recently and 3 pills were added, unclear of op meds.  Reports epigastric pain, reproducible.     complains of abdominal pain and nausea, no chest pain or SOB    Review of Systems:   Constitutional: [ ] fevers, [ ] chills.   Skin: [ ] dry skin. [ ] rashes.  Psychiatric: [ ] depression, [ ] anxiety.   Gastrointestinal: [ ] BRBPR, [ ] melena.   Neurological: [ ] confusion. [ ] seizures. [ ] shuffling gait.   Ears,Nose,Mouth and Throat: [ ] ear pain [ ] sore throat.   Eyes: [ ] diplopia.   Respiratory: [ ] hemoptysis. [ ] shortness of breath  Cardiovascular: See HPI above  Hematologic/Lymphatic: [ ] anemia. [ ] painful nodes. [ ] prolonged bleeding.   Genitourinary: [ ] hematuria. [ ] flank pain.   Endocrine: [ ] significant change in weight. [ ] intolerance to heat and cold.     Review of systems [ x] otherwise negative, [ ] otherwise unable to obtain    FH: no family history of sudden cardiac death in first degree relatives    SH: [ ] tobacco, [ ] alcohol, [ ] drugs    amLODIPine   Tablet 5 milliGRAM(s) Oral daily  atorvastatin 20 milliGRAM(s) Oral at bedtime  enoxaparin Injectable 40 milliGRAM(s) SubCutaneous every 24 hours  glucagon  Injectable 1 milliGRAM(s) IntraMuscular once  insulin lispro (ADMELOG) corrective regimen sliding scale   SubCutaneous at bedtime  insulin lispro (ADMELOG) corrective regimen sliding scale   SubCutaneous three times a day before meals  lisinopril 20 milliGRAM(s) Oral daily  pantoprazole    Tablet 40 milliGRAM(s) Oral before breakfast  polyethylene glycol 3350 17 Gram(s) Oral two times a day  senna 2 Tablet(s) Oral at bedtime  sodium chloride 0.9%. 1000 milliLiter(s) IV Continuous <Continuous>                     11.2   4.85  )-----------( 174      ( 23 Aug 2023 05:22 )             35.6       140  |  104  |  20  ----------------------------<  128<H>  3.8   |  24  |  0.74    Ca    9.4      23 Aug 2023 05:22  Phos  3.4     08-23  Mg     2.20     08-23      T(C): 36.7 (08-23-23 @ 12:09), Max: 37.2 (08-22-23 @ 17:03)  HR: 89 (08-23-23 @ 12:09) (75 - 95)  BP: 154/80 (08-23-23 @ 12:09) (127/41 - 154/80)  RR: 18 (08-23-23 @ 12:09) (17 - 18)  SpO2: 100% (08-23-23 @ 12:09) (98% - 100%)  Wt(kg): --    I&O's Summary    22 Aug 2023 07:01  -  23 Aug 2023 07:00  --------------------------------------------------------  IN: 600 mL / OUT: 550 mL / NET: 50 mL      General: Well nourished in no acute distress. Alert and Oriented * 3.   Head: Normocephalic and atraumatic.   Neck: No JVD. No bruits. Supple. Does not appear to be enlarged.   Cardiovascular: + S1,S2 ; RRR Soft systolic murmur at the left lower sternal border. No rubs noted.    Lungs: CTA b/l. No rhonchi, rales or wheezes.   Abdomen: + BS, soft. Non tender. Non distended. No rebound. No guarding.   Extremities: No clubbing/cyanosis/edema.   Neurologic: Moves all four extremities. Full range of motion.   Skin: Warm and moist. The patient's skin has normal elasticity and good skin turgor.   Psychiatric: Appropriate mood and affect.  Musculoskeletal: Normal range of motion, normal strength    DATA    tele- SR    ECG:  	NSR    < from: CT Abdomen and Pelvis w/ IV Cont (08.21.23 @ 03:38) >    IMPRESSION:  No acute CT abnormality.    < end of copied text >      ASSESSMENT/PLAN: 	72 year old Female with PMH of non-insulin DM, HTN, and HLD presents c/o 2 episodes of NBNB N/V today. She reports DM meds were adjusted recently and 3 pills were added, unclear of op meds.  Reports epigastric pain, reproducible.     1. Abnormal troponin T  --Trop T 15--9, not c/w ACS  --TTE with structurally normal heart  --no further work up planned  --EKG overnight - NSR, NE 198ms, normal intervals    2. Abdominal pain  --CT negative as above  --pain resolved after having a BM  --Rx per medicine    3. DM  --per medicine    OP F/U with Cardio Clinic after DC    Chante ESPINOSA  [phone]

## 2023-08-24 LAB
ANION GAP SERPL CALC-SCNC: 15 MMOL/L — HIGH (ref 7–14)
BUN SERPL-MCNC: 12 MG/DL — SIGNIFICANT CHANGE UP (ref 7–23)
CALCIUM SERPL-MCNC: 9.4 MG/DL — SIGNIFICANT CHANGE UP (ref 8.4–10.5)
CHLORIDE SERPL-SCNC: 104 MMOL/L — SIGNIFICANT CHANGE UP (ref 98–107)
CO2 SERPL-SCNC: 22 MMOL/L — SIGNIFICANT CHANGE UP (ref 22–31)
CREAT SERPL-MCNC: 0.65 MG/DL — SIGNIFICANT CHANGE UP (ref 0.5–1.3)
EGFR: 92 ML/MIN/1.73M2 — SIGNIFICANT CHANGE UP
GLUCOSE BLDC GLUCOMTR-MCNC: 134 MG/DL — HIGH (ref 70–99)
GLUCOSE BLDC GLUCOMTR-MCNC: 151 MG/DL — HIGH (ref 70–99)
GLUCOSE BLDC GLUCOMTR-MCNC: 155 MG/DL — HIGH (ref 70–99)
GLUCOSE BLDC GLUCOMTR-MCNC: 194 MG/DL — HIGH (ref 70–99)
GLUCOSE SERPL-MCNC: 135 MG/DL — HIGH (ref 70–99)
HCT VFR BLD CALC: 35.3 % — SIGNIFICANT CHANGE UP (ref 34.5–45)
HGB BLD-MCNC: 11.2 G/DL — LOW (ref 11.5–15.5)
MAGNESIUM SERPL-MCNC: 2.5 MG/DL — SIGNIFICANT CHANGE UP (ref 1.6–2.6)
MCHC RBC-ENTMCNC: 29 PG — SIGNIFICANT CHANGE UP (ref 27–34)
MCHC RBC-ENTMCNC: 31.7 GM/DL — LOW (ref 32–36)
MCV RBC AUTO: 91.5 FL — SIGNIFICANT CHANGE UP (ref 80–100)
NRBC # BLD: 0 /100 WBCS — SIGNIFICANT CHANGE UP (ref 0–0)
NRBC # FLD: 0 K/UL — SIGNIFICANT CHANGE UP (ref 0–0)
PHOSPHATE SERPL-MCNC: 3.5 MG/DL — SIGNIFICANT CHANGE UP (ref 2.5–4.5)
PLATELET # BLD AUTO: 168 K/UL — SIGNIFICANT CHANGE UP (ref 150–400)
POTASSIUM SERPL-MCNC: 4.1 MMOL/L — SIGNIFICANT CHANGE UP (ref 3.5–5.3)
POTASSIUM SERPL-SCNC: 4.1 MMOL/L — SIGNIFICANT CHANGE UP (ref 3.5–5.3)
RBC # BLD: 3.86 M/UL — SIGNIFICANT CHANGE UP (ref 3.8–5.2)
RBC # FLD: 13.2 % — SIGNIFICANT CHANGE UP (ref 10.3–14.5)
SODIUM SERPL-SCNC: 141 MMOL/L — SIGNIFICANT CHANGE UP (ref 135–145)
WBC # BLD: 4.96 K/UL — SIGNIFICANT CHANGE UP (ref 3.8–10.5)
WBC # FLD AUTO: 4.96 K/UL — SIGNIFICANT CHANGE UP (ref 3.8–10.5)

## 2023-08-24 PROCEDURE — 99232 SBSQ HOSP IP/OBS MODERATE 35: CPT

## 2023-08-24 PROCEDURE — 99221 1ST HOSP IP/OBS SF/LOW 40: CPT

## 2023-08-24 RX ADMIN — AMLODIPINE BESYLATE 5 MILLIGRAM(S): 2.5 TABLET ORAL at 05:31

## 2023-08-24 RX ADMIN — Medication 1: at 08:29

## 2023-08-24 RX ADMIN — SENNA PLUS 2 TABLET(S): 8.6 TABLET ORAL at 21:47

## 2023-08-24 RX ADMIN — LISINOPRIL 20 MILLIGRAM(S): 2.5 TABLET ORAL at 05:34

## 2023-08-24 RX ADMIN — Medication 1: at 12:33

## 2023-08-24 RX ADMIN — ENOXAPARIN SODIUM 40 MILLIGRAM(S): 100 INJECTION SUBCUTANEOUS at 21:47

## 2023-08-24 RX ADMIN — PANTOPRAZOLE SODIUM 40 MILLIGRAM(S): 20 TABLET, DELAYED RELEASE ORAL at 05:35

## 2023-08-24 RX ADMIN — ATORVASTATIN CALCIUM 20 MILLIGRAM(S): 80 TABLET, FILM COATED ORAL at 21:46

## 2023-08-24 NOTE — DIETITIAN INITIAL EVALUATION ADULT - ORAL INTAKE PTA/DIET HISTORY
Pt reports good appetite at home. No specific diet followed PTA. Pt reports she tries to avoid drinking juice. Admitted with N/V. Reports finger sticks at home ~220 mg/dL

## 2023-08-24 NOTE — CONSULT NOTE ADULT - ASSESSMENT
A/P: 71 yo F with DM2, HTN, HLD, presents with nausea, vomiting. Endocrine consulted for DM2 management.    #Type 2 Diabetes Mellitus, uncontrolled  - HbA1c 9.3%; home regimen: glipizide 10 mg BID, jardiance 10 mg daily, metformin 1 g bid, pioglitazone 15 mg daily, repaglinide 8 mg bid  - Recommend low dose admelog correction scale TIDQAC and QHS  - Please check FSG before meals and QHS, or q6h while NPO  - RD consult  - hypoglycemia orderset prn  - extensively discussed importance of glycemic control to prevent micro- and macrovascular complications  - discussed importance of weight loss, exercise, and changing diet   - reviewed symptoms and management of hypoglycemia  - reviewed basics of insulin administration and pharmacokinetics, glucometer monitoring, as well as glycemic goals for outpatient setting  - Discharge planning:  Pt prefers to simplify regimen outpatient, she is requesting insulin and less PO meds. Discussed perhaps maximizing doses on some meds and reducing number of pills with dietary changes but pt would prefer insulin.   Tentative plan: lantus 8 units QHS (to start if eating better at home and sugars >180) + jardiance 25 mg daily + metformin 1 g bid [egfr 85]  Stop glipizide, repaglinide, pioglitazone  will need insulin pen teaching  defer glp1 agonist for now given significant GI symptoms    Pt has [employer] insurance.  Can see Dr. Rashawn Glynn outpatient - [phone]     #Hypertension  - BP goal <130/80  - Management as per primary team - lisinopril 20 mg    #Hyperlipidemia  - statin: atorvastatin 20 mg    Xiomara Jimenez MD  Attending Physician   Department of Endocrinology, Diabetes and Metabolism   [employer] Teams for 08-23-23 @ 16:38.    If before 9AM or after 5PM, or on weekends/holidays, please call the Endocrine answering service for assistance ([phone]).  For nonurgent matters, please email [email or web address] for assistance.     Please note that a different provider may be following this patient each day.       
71 yo woman with diabetes chronic abdominal discomfort now admitted 8/21 with nausea x 1 day and loose stool   Stool PCR detected enteroaggregative E coli   Nausea and diarrhea resolved  No signs of systemic toxicity   Diabetes may also be playing a role in her more chronic symptoms     Supportive care- fluids, electrolyte replacement   Would not start antibiotics   Will f/u pending stool culture

## 2023-08-24 NOTE — PROGRESS NOTE ADULT - ASSESSMENT
A/P: 71 yo F with DM2, HTN, HLD, presents with nausea, vomiting. Endocrine consulted for DM2 management.    #Type 2 Diabetes Mellitus, uncontrolled  - HbA1c 9.3%; home regimen: glipizide 10 mg BID, jardiance 10 mg daily, metformin 1 g bid, pioglitazone 15 mg daily, repaglinide 8 mg bid  - Glucose target stable today   - Recommend low dose admelog correction scale TIDQAC and QHS  - Please check FSG before meals and QHS, or q6h while NPO  - RD consult  - hypoglycemia orderset prn  - extensively discussed importance of glycemic control to prevent micro- and macrovascular complications  - discussed importance of weight loss, exercise, and changing diet   - reviewed symptoms and management of hypoglycemia  - reviewed basics of insulin administration and pharmacokinetics, glucometer monitoring, as well as glycemic goals for outpatient setting  - Discharge planning:  Pt prefers to simplify regimen outpatient, she is requesting insulin and less PO meds. Discussed perhaps maximizing doses on some meds and reducing number of pills with dietary changes but pt would prefer insulin.   Tentative plan: lantus 8 units QHS (to start if eating better at home and sugars >180) + jardiance 25 mg daily + metformin 1 g bid [egfr 85]  Stop glipizide, repaglinide, pioglitazone  will need insulin pen teaching  defer glp1 agonist for now given significant GI symptoms  Pt has [employer] insurance.  Can see Dr. Rashawn Glynn outpatient - [phone]     #Hypertension  - BP goal <130/80  - Management as per primary team - lisinopril 20 mg    #Hyperlipidemia  - statin: atorvastatin 20 mg    Dorys Hairston  Nurse Practitioner  Division of Endocrinology & Diabetes  In house pager #[phone]    If before 9AM or after 6PM, or on weekends/holidays, please call endocrine answering service for assistance ([phone]).For nonurgent matters email [email or web address] for assistance.    A/P: 71 yo F with DM2, HTN, HLD, presents with nausea, vomiting. Endocrine consulted for DM2 management.    #Type 2 Diabetes Mellitus, uncontrolled  - HbA1c 9.3%; home regimen: glipizide 10 mg BID, jardiance 10 mg daily, metformin 1 g bid, pioglitazone 15 mg daily, repaglinide 8 mg bid  - Glucose target slightly above goal at bedtime   - Recommend low dose admelog correction scale TIDQAC and QHS  - Please check FSG before meals and QHS, or q6h while NPO  - RD consult  - hypoglycemia orderset prn  - extensively discussed importance of glycemic control to prevent micro- and macrovascular complications  - discussed importance of weight loss, exercise, and changing diet   - reviewed symptoms and management of hypoglycemia  - reviewed basics of insulin administration and pharmacokinetics, glucometer monitoring, as well as glycemic goals for outpatient setting  - Provider to Rn for insulin pen coverage   - Discharge planning:  Pt prefers to simplify regimen outpatient, she is requesting insulin and less PO meds. Discussed perhaps maximizing doses on some meds and reducing number of pills with dietary changes but pt would prefer insulin.   Tentative plan: lantus 8 units QHS (to start if eating better at home and sugars >180) + jardiance 25 mg daily + metformin 1 g bid [egfr 85]  Stop glipizide, repaglinide, pioglitazone  will need insulin pen teaching  defer glp1 agonist for now given significant GI symptoms  Pt has [employer] insurance.  Can see Dr. Rashawn Glynn outpatient - [phone]     #Hypertension  - BP goal <130/80  - Management as per primary team - lisinopril 20 mg    #Hyperlipidemia  - statin: atorvastatin 20 mg    Dorys Hairston  Nurse Practitioner  Division of Endocrinology & Diabetes  In house pager #[phone]    If before 9AM or after 6PM, or on weekends/holidays, please call endocrine answering service for assistance ([phone]).For nonurgent matters email [email or web address] for assistance.

## 2023-08-24 NOTE — DIETITIAN INITIAL EVALUATION ADULT - OTHER INFO
Medical course: Per chart 72 year old Female with PMH of non-insulin DM, HTN, and HLD presents c/o 2 episodes of NBNB N/V today. Patient notes that for several months she was having issues with having a "bad feeling" in her abdomen that was a discomfort described more as nausea rather than overt pain. Today the discomfort started at around 11am and has been constant since that time with associated 2 episodes of NBNB vomiting, lightheaded dizziness, shakiness, and pre-syncope prompting current visit. Of note, she did have her DM medications adjusted 3 weeks earlier with new medications added to her previous regimen but her sporadic symptoms were still going on before that point.    Nutrition interview: Pt A&Ox4. Pt reports nausea/vomiting has resolved. No recent episodes of diarrhea or constipation. Last BM noted on 8/23 per RN flowsheets. Continues on bowel regimen. Denies any chewing/swallowing difficulties. No known food allergies. Stated UBW: 180#.  Intake is 50-75% per pt. Feeding skills: independent.   Pt with DM, A1c 9.3%. Being followed by endocrine for management of insulin regimen. Pt unaware of nutrition recommendations for DM. Provided and explained "Carbohydrate counting for people with diabetes" handout.

## 2023-08-24 NOTE — PROGRESS NOTE ADULT - NUTRITIONAL ASSESSMENT
This patient has been assessed with a concern for Malnutrition and has been determined to have a diagnosis/diagnoses of Morbid obesity (BMI > 40).    This patient is being managed with:   Diet Consistent Carbohydrate w/Evening Snack-  DASH/TLC {Sodium & Cholesterol Restricted} (DASH)  Low Sodium  No Caffeine  Entered: Aug 21 2023  9:18AM  
This patient has been assessed with a concern for Malnutrition and has been determined to have a diagnosis/diagnoses of Morbid obesity (BMI > 40).    This patient is being managed with:   Diet Consistent Carbohydrate w/Evening Snack-  DASH/TLC {Sodium & Cholesterol Restricted} (DASH)  Low Sodium  No Caffeine  Entered: Aug 21 2023  9:18AM

## 2023-08-24 NOTE — DIETITIAN INITIAL EVALUATION ADULT - PERTINENT LABORATORY DATA
08-24    141  |  104  |  12  ----------------------------<  135<H>  4.1   |  22  |  0.65    Ca    9.4      24 Aug 2023 05:33  Phos  3.5     08-24  Mg     2.50     08-24    POCT Blood Glucose.: 194 mg/dL (08-24-23 @ 12:26)  A1C with Estimated Average Glucose Result: 9.3 % (08-23-23 @ 05:22)

## 2023-08-24 NOTE — PROGRESS NOTE ADULT - SUBJECTIVE AND OBJECTIVE BOX
Date of service 8/24/23    chief complaint: abdominal pain    extended hpi: 72 year old Female with PMH of non-insulin DM, HTN, and HLD presents c/o 2 episodes of NBNB N/V today. She reports DM meds were adjusted recently and 3 pills were added, unclear of op meds.  Reports epigastric pain, reproducible.     Abd pain and nausea improved. denies chest pain or SOB. Review of systems otherwise negative    Review of Systems:   Constitutional: [ ] fevers, [ ] chills.   Skin: [ ] dry skin. [ ] rashes.  Psychiatric: [ ] depression, [ ] anxiety.   Gastrointestinal: [ ] BRBPR, [ ] melena.   Neurological: [ ] confusion. [ ] seizures. [ ] shuffling gait.   Ears,Nose,Mouth and Throat: [ ] ear pain [ ] sore throat.   Eyes: [ ] diplopia.   Respiratory: [ ] hemoptysis. [ ] shortness of breath  Cardiovascular: See HPI above  Hematologic/Lymphatic: [ ] anemia. [ ] painful nodes. [ ] prolonged bleeding.   Genitourinary: [ ] hematuria. [ ] flank pain.   Endocrine: [ ] significant change in weight. [ ] intolerance to heat and cold.     Review of systems [ x] otherwise negative, [ ] otherwise unable to obtain    FH: no family history of sudden cardiac death in first degree relatives    SH: [ ] tobacco, [ ] alcohol, [ ] drugs    MEDICATIONS:  amLODIPine   Tablet 5 milliGRAM(s) Oral daily  atorvastatin 20 milliGRAM(s) Oral at bedtime  dextrose 5%. 1000 milliLiter(s) IV Continuous <Continuous>  dextrose 5%. 1000 milliLiter(s) IV Continuous <Continuous>  dextrose 50% Injectable 25 Gram(s) IV Push once  dextrose 50% Injectable 12.5 Gram(s) IV Push once  dextrose 50% Injectable 25 Gram(s) IV Push once  dextrose Oral Gel 15 Gram(s) Oral once PRN  enoxaparin Injectable 40 milliGRAM(s) SubCutaneous every 24 hours  glucagon  Injectable 1 milliGRAM(s) IntraMuscular once  insulin lispro (ADMELOG) corrective regimen sliding scale   SubCutaneous at bedtime  insulin lispro (ADMELOG) corrective regimen sliding scale   SubCutaneous three times a day before meals  lisinopril 20 milliGRAM(s) Oral daily  pantoprazole    Tablet 40 milliGRAM(s) Oral before breakfast  polyethylene glycol 3350 17 Gram(s) Oral two times a day  senna 2 Tablet(s) Oral at bedtime  sodium chloride 0.9%. 1000 milliLiter(s) IV Continuous <Continuous>      LABS:                        11.2   4.96  )-----------( 168      ( 24 Aug 2023 05:33 )             35.3       Hemoglobin: 11.2 g/dL (08-24 @ 05:33)  Hemoglobin: 11.2 g/dL (08-23 @ 05:22)  Hemoglobin: 10.9 g/dL (08-22 @ 05:53)  Hemoglobin: 11.6 g/dL (08-21 @ 10:06)  Hemoglobin: 11.8 g/dL (08-20 @ 22:14)      08-24    141  |  104  |  12  ----------------------------<  135<H>  4.1   |  22  |  0.65    Ca    9.4      24 Aug 2023 05:33  Phos  3.5     08-24  Mg     2.50     08-24      Creatinine Trend: 0.65<--, 0.74<--, 0.75<--, 0.79<--, 0.81<--    COAGS:           PHYSICAL EXAM:  T(C): 37.1 (08-24-23 @ 13:25), Max: 37.1 (08-24-23 @ 13:25)  HR: 64 (08-24-23 @ 13:25) (64 - 89)  BP: 144/64 (08-24-23 @ 13:25) (143/60 - 153/66)  RR: 18 (08-24-23 @ 13:25) (18 - 18)  SpO2: 100% (08-24-23 @ 13:25) (100% - 100%)  Wt(kg): --    I&O's Summary    23 Aug 2023 07:01  -  24 Aug 2023 07:00  --------------------------------------------------------  IN: 495 mL / OUT: 0 mL / NET: 495 mL    24 Aug 2023 07:01  -  24 Aug 2023 13:52  --------------------------------------------------------  IN: 360 mL / OUT: 0 mL / NET: 360 mL        General: Well nourished in no acute distress. Alert and Oriented * 3.   Head: Normocephalic and atraumatic.   Neck: No JVD. No bruits. Supple. Does not appear to be enlarged.   Cardiovascular: + S1,S2 ; RRR Soft systolic murmur at the left lower sternal border. No rubs noted.    Lungs: CTA b/l. No rhonchi, rales or wheezes.   Abdomen: + BS, soft. Non tender. Non distended. No rebound. No guarding.   Extremities: No clubbing/cyanosis/edema.   Neurologic: Moves all four extremities. Full range of motion.   Skin: Warm and moist. The patient's skin has normal elasticity and good skin turgor.   Psychiatric: Appropriate mood and affect.  Musculoskeletal: Normal range of motion, normal strength    DATA    tele- SR    ECG: NSR    < from: CT Abdomen and Pelvis w/ IV Cont (08.21.23 @ 03:38) >    IMPRESSION:  No acute CT abnormality.    < end of copied text >      ASSESSMENT/PLAN: 	72 year old Female with PMH of non-insulin DM, HTN, and HLD presents c/o 2 episodes of NBNB N/V today. She reports DM meds were adjusted recently and 3 pills were added, unclear of op meds.  Reports epigastric pain, reproducible.     1. Abnormal troponin T  --Trop T 15--9, not c/w ACS  --TTE with structurally normal heart  --no further inpatient cardiac w/u planned    2. Abdominal pain  --CT negative as above  --pain resolved after having a BM  --Rx per medicine    3. DM  --management per medicine    OP F/U with [facility] after DC    Saravanan Jimenez PA-C  Pager: [phone]

## 2023-08-24 NOTE — DIETITIAN INITIAL EVALUATION ADULT - NS FNS DIET ORDER
Diet, Consistent Carbohydrate w/Evening Snack:   DASH/TLC {Sodium & Cholesterol Restricted} (DASH)  Low Sodium  No Caffeine (08-21-23 @ 09:18) [Active]

## 2023-08-24 NOTE — PROGRESS NOTE ADULT - SUBJECTIVE AND OBJECTIVE BOX
Chief Complaint: Type 2 Diabetes Mellitus, uncontrolled    History: Pt seen at bedside. Pt tolerating oral diet. Pt denies nausea and vomiting/any sign of hypoglycemia. Pt reports an adequate appetite.     MEDICATIONS  (STANDING):  amLODIPine   Tablet 5 milliGRAM(s) Oral daily  atorvastatin 20 milliGRAM(s) Oral at bedtime  dextrose 5%. 1000 milliLiter(s) (100 mL/Hr) IV Continuous <Continuous>  dextrose 5%. 1000 milliLiter(s) (50 mL/Hr) IV Continuous <Continuous>  dextrose 50% Injectable 25 Gram(s) IV Push once  dextrose 50% Injectable 25 Gram(s) IV Push once  dextrose 50% Injectable 12.5 Gram(s) IV Push once  enoxaparin Injectable 40 milliGRAM(s) SubCutaneous every 24 hours  glucagon  Injectable 1 milliGRAM(s) IntraMuscular once  insulin lispro (ADMELOG) corrective regimen sliding scale   SubCutaneous at bedtime  insulin lispro (ADMELOG) corrective regimen sliding scale   SubCutaneous three times a day before meals  lisinopril 20 milliGRAM(s) Oral daily  pantoprazole    Tablet 40 milliGRAM(s) Oral before breakfast  polyethylene glycol 3350 17 Gram(s) Oral two times a day  senna 2 Tablet(s) Oral at bedtime  sodium chloride 0.9%. 1000 milliLiter(s) (100 mL/Hr) IV Continuous <Continuous>    MEDICATIONS  (PRN):  dextrose Oral Gel 15 Gram(s) Oral once PRN Blood Glucose LESS THAN 70 milliGRAM(s)/deciliter      Allergies: No Known Allergies      Review of Systems:  Respiratory: No SOB, no cough  GI: No nausea, vomiting, abdominal pain  Endocrine: no polyuria, polydipsia      PHYSICAL EXAM:  VITALS: T(C): 36.9 (08-24-23 @ 16:18)  T(F): 98.4 (08-24-23 @ 16:18), Max: 98.8 (08-24-23 @ 13:25)  HR: 89 (08-24-23 @ 16:18) (64 - 89)  BP: 144/65 (08-24-23 @ 16:18) (143/60 - 152/66)  RR:  (18 - 18)  SpO2:  (100% - 100%)  Wt(kg): --  GENERAL: NAD, well-groomed, well-developed  RESPIRATORY: No labored breathing   GI: Soft, nontender, non distended  PSYCH: Alert and oriented x 3, normal affect, normal mood      CAPILLARY BLOOD GLUCOSE  POCT Blood Glucose.: 134 mg/dL (24 Aug 2023 17:26)  POCT Blood Glucose.: 194 mg/dL (24 Aug 2023 12:26)  POCT Blood Glucose.: 155 mg/dL (24 Aug 2023 08:14)  POCT Blood Glucose.: 251 mg/dL (23 Aug 2023 21:47)    A1C with Estimated Average Glucose in AM (08.23.23 @ 05:22)    A1C with Estimated Average Glucose Result: 9.3   Estimated Average Glucose: 220      08-24    141  |  104  |  12  ----------------------------<  135<H>  4.1   |  22  |  0.65    eGFR: 92    Ca    9.4      08-24  Mg     2.50     08-24  Phos  3.5     08-24    Diet, Consistent Carbohydrate w/Evening Snack:   DASH/TLC Sodium & Cholesterol Restricted (DASH)  Low Sodium  No Caffeine (08-21-23 @ 09:18) [Active]

## 2023-08-24 NOTE — DIETITIAN INITIAL EVALUATION ADULT - PERTINENT MEDS FT
MEDICATIONS  (STANDING):  amLODIPine   Tablet 5 milliGRAM(s) Oral daily  atorvastatin 20 milliGRAM(s) Oral at bedtime  dextrose 5%. 1000 milliLiter(s) (100 mL/Hr) IV Continuous <Continuous>  dextrose 5%. 1000 milliLiter(s) (50 mL/Hr) IV Continuous <Continuous>  dextrose 50% Injectable 25 Gram(s) IV Push once  dextrose 50% Injectable 25 Gram(s) IV Push once  dextrose 50% Injectable 12.5 Gram(s) IV Push once  enoxaparin Injectable 40 milliGRAM(s) SubCutaneous every 24 hours  glucagon  Injectable 1 milliGRAM(s) IntraMuscular once  insulin lispro (ADMELOG) corrective regimen sliding scale   SubCutaneous at bedtime  insulin lispro (ADMELOG) corrective regimen sliding scale   SubCutaneous three times a day before meals  lisinopril 20 milliGRAM(s) Oral daily  pantoprazole    Tablet 40 milliGRAM(s) Oral before breakfast  polyethylene glycol 3350 17 Gram(s) Oral two times a day  senna 2 Tablet(s) Oral at bedtime  sodium chloride 0.9%. 1000 milliLiter(s) (100 mL/Hr) IV Continuous <Continuous>    MEDICATIONS  (PRN):  dextrose Oral Gel 15 Gram(s) Oral once PRN Blood Glucose LESS THAN 70 milliGRAM(s)/deciliter

## 2023-08-24 NOTE — PROGRESS NOTE ADULT - ASSESSMENT
71 y/o F with PMH of non-insulin DM, HTN, and HLD presents c/o 2 episodes of NBNB N/V today. Patient notes that for several months she was having issues with having a "bad feeling" in her abdomen that was a discomfort described more as nausea rather than overt pain. The discomfort was originally sporadic and would only last for a few minutes at a time with complete resolution after each incident so she did not pursue any medical evaluations or interventions for it prior to this visit. However, today the discomfort started at around 11am and has been constant since that time with associated 2 episodes of NBNB vomiting, lightheaded dizziness, shakiness, and pre-syncope prompting current visit. At time of exam patient endorses some improvement of of symptoms but slight residual discomfort. Of note, she did have her DM medications adjusted 3 weeks earlier with new medications added to her previous regimen but her sporadic symptoms were still going on before that point. Denies any other complaints or concerns. Denies chest pain, SOB, cough, fevers, chills, diarrhea, constipation, urinary complaints, HA, numbness, tingling, weakness, sick contacts, recent travel. nausea and vomiting     1 Chest pain  - telemonitor  - cards fu   - ro ACS  - Ischemia casey as per cards    2 Nausea and vomitng, abdominal pain  - cw protonix  - CT neg- resolved   - will monitor    3 DM  - monitor FS  - ISS  - endo consult    dc plannning

## 2023-08-24 NOTE — DIETITIAN INITIAL EVALUATION ADULT - ADD RECOMMEND
1) Recommend CSTCHO, DASH/TLC diet  2) Recommend follow up with outpatient RD for long term management of DM   3) Monitor weights, labs, BM's, skin integrity, p.o. intake.   4) Encourage PO intake and honor food preferences as able.

## 2023-08-24 NOTE — DIETITIAN INITIAL EVALUATION ADULT - NSFNSGIIOFT_GEN_A_CORE
08-23-23 @ 07:01  -  08-24-23 @ 07:00  --------------------------------------------------------  OUT:    Stool (mL): 0 mL  Total OUT: 0 mL    Total NET: 0 mL

## 2023-08-24 NOTE — PROGRESS NOTE ADULT - SUBJECTIVE AND OBJECTIVE BOX
Patient is a 72y old  Female who presents with a chief complaint of Epigastric pain     (24 Aug 2023 14:48)    Date of servie : 08-24-23 @ 16:17  INTERVAL HPI/OVERNIGHT EVENTS:  T(C): 37.1 (08-24-23 @ 13:25), Max: 37.1 (08-24-23 @ 13:25)  HR: 64 (08-24-23 @ 13:25) (64 - 89)  BP: 144/64 (08-24-23 @ 13:25) (143/60 - 153/66)  RR: 18 (08-24-23 @ 13:25) (18 - 18)  SpO2: 100% (08-24-23 @ 13:25) (100% - 100%)  Wt(kg): --  I&O's Summary    23 Aug 2023 07:01  -  24 Aug 2023 07:00  --------------------------------------------------------  IN: 495 mL / OUT: 0 mL / NET: 495 mL    24 Aug 2023 07:01  -  24 Aug 2023 16:17  --------------------------------------------------------  IN: 360 mL / OUT: 0 mL / NET: 360 mL        LABS:                        11.2   4.96  )-----------( 168      ( 24 Aug 2023 05:33 )             35.3     08-24    141  |  104  |  12  ----------------------------<  135<H>  4.1   |  22  |  0.65    Ca    9.4      24 Aug 2023 05:33  Phos  3.5     08-24  Mg     2.50     08-24        Urinalysis Basic - ( 24 Aug 2023 05:33 )    Color: x / Appearance: x / SG: x / pH: x  Gluc: 135 mg/dL / Ketone: x  / Bili: x / Urobili: x   Blood: x / Protein: x / Nitrite: x   Leuk Esterase: x / RBC: x / WBC x   Sq Epi: x / Non Sq Epi: x / Bacteria: x      CAPILLARY BLOOD GLUCOSE      POCT Blood Glucose.: 194 mg/dL (24 Aug 2023 12:26)  POCT Blood Glucose.: 155 mg/dL (24 Aug 2023 08:14)  POCT Blood Glucose.: 251 mg/dL (23 Aug 2023 21:47)  POCT Blood Glucose.: 98 mg/dL (23 Aug 2023 17:22)        Urinalysis Basic - ( 24 Aug 2023 05:33 )    Color: x / Appearance: x / SG: x / pH: x  Gluc: 135 mg/dL / Ketone: x  / Bili: x / Urobili: x   Blood: x / Protein: x / Nitrite: x   Leuk Esterase: x / RBC: x / WBC x   Sq Epi: x / Non Sq Epi: x / Bacteria: x        MEDICATIONS  (STANDING):  amLODIPine   Tablet 5 milliGRAM(s) Oral daily  atorvastatin 20 milliGRAM(s) Oral at bedtime  dextrose 5%. 1000 milliLiter(s) (100 mL/Hr) IV Continuous <Continuous>  dextrose 5%. 1000 milliLiter(s) (50 mL/Hr) IV Continuous <Continuous>  dextrose 50% Injectable 25 Gram(s) IV Push once  dextrose 50% Injectable 12.5 Gram(s) IV Push once  dextrose 50% Injectable 25 Gram(s) IV Push once  enoxaparin Injectable 40 milliGRAM(s) SubCutaneous every 24 hours  glucagon  Injectable 1 milliGRAM(s) IntraMuscular once  insulin lispro (ADMELOG) corrective regimen sliding scale   SubCutaneous three times a day before meals  insulin lispro (ADMELOG) corrective regimen sliding scale   SubCutaneous at bedtime  lisinopril 20 milliGRAM(s) Oral daily  pantoprazole    Tablet 40 milliGRAM(s) Oral before breakfast  polyethylene glycol 3350 17 Gram(s) Oral two times a day  senna 2 Tablet(s) Oral at bedtime  sodium chloride 0.9%. 1000 milliLiter(s) (100 mL/Hr) IV Continuous <Continuous>    MEDICATIONS  (PRN):  dextrose Oral Gel 15 Gram(s) Oral once PRN Blood Glucose LESS THAN 70 milliGRAM(s)/deciliter          PHYSICAL EXAM:  GENERAL: NAD, well-groomed, well-developed  HEAD:  Atraumatic, Normocephalic  CHEST/LUNG: Clear to percussion bilaterally; No rales, rhonchi, wheezing, or rubs  HEART: Regular rate and rhythm; No murmurs, rubs, or gallops  ABDOMEN: Soft, Nontender, Nondistended; Bowel sounds present  EXTREMITIES:  2+ Peripheral Pulses, No clubbing, cyanosis, or edema  LYMPH: No lymphadenopathy noted  SKIN: No rashes or lesions    Care Discussed with Consultants/Other Providers [ ] YES  [ ] NO

## 2023-08-24 NOTE — CONSULT NOTE ADULT - SUBJECTIVE AND OBJECTIVE BOX
HPI:   71 y/o F with PMH of non-insulin DM, HTN, and HLD presents c/o 2 episodes of NBNB N/V today. Patient notes that for several months she was having issues with having a "bad feeling" in her abdomen that was a discomfort described more as nausea rather than overt pain. The discomfort was originally sporadic and would only last for a few minutes at a time with complete resolution after each incident so she did not pursue any medical evaluations or interventions for it prior to this visit. However, today the discomfort started at around 11am and has been constant since that time with associated 2 episodes of NBNB vomiting, lightheaded dizziness, shakiness, and pre-syncope prompting current visit. At time of exam patient endorses some improvement of of symptoms but slight residual discomfort. Of note, she did have her DM medications adjusted 3 weeks earlier with new medications added to her previous regimen but her sporadic symptoms were still going on before that point. Denies any other complaints or concerns. Denies chest pain, SOB, cough, fevers, chills, diarrhea, constipation, urinary complaints, HA, numbness, tingling, weakness, sick contacts, recent travel. 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 1000 milliLiter(s) IV Continuous <Continuous>  dextrose 5%. 1000 milliLiter(s) IV Continuous <Continuous>  dextrose 50% Injectable 25 Gram(s) IV Push once  dextrose 50% Injectable 12.5 Gram(s) IV Push once  dextrose 50% Injectable 25 Gram(s) IV Push once  dextrose Oral Gel 15 Gram(s) Oral once PRN  enoxaparin Injectable 40 milliGRAM(s) SubCutaneous every 24 hours  glucagon  Injectable 1 milliGRAM(s) IntraMuscular once  insulin lispro (ADMELOG) corrective regimen sliding scale   SubCutaneous three times a day before meals  insulin lispro (ADMELOG) corrective regimen sliding scale   SubCutaneous at bedtime  lisinopril 20 milliGRAM(s) Oral daily  pantoprazole    Tablet 40 milliGRAM(s) Oral before breakfast  polyethylene glycol 3350 17 Gram(s) Oral two times a day  senna 2 Tablet(s) Oral at bedtime  sodium chloride 0.9%. 1000 milliLiter(s) IV Continuous <Continuous>      SOCIAL HISTORY:  no recent travel  home health aide    FAMILY HISTORY:      REVIEW OF SYSTEMS  [  ] ROS unobtainable because:    [ x ] All other systems negative except as noted below:	    Constitutional:  [ ] fever [ ] chills  [ ] weight loss  [ ] weakness  Skin:  [ ] rash [ ] phlebitis	  Eyes: [ ] icterus [ ] pain  [ ] discharge	  ENMT: [ ] sore throat  [ ] thrush [ ] ulcers [ ] exudates  Respiratory: [ ] dyspnea [ ] hemoptysis [ ] cough [ ] sputum	  Cardiovascular:  [ ] chest pain [ ] palpitations [ ] edema	  Gastrointestinal:  [ ] nausea [ ] vomiting x] diarrhea [ ] constipation [ ] pain	  Genitourinary:  [ ] dysuria [ ] frequency [ ] hematuria [ ] discharge [ ] flank pain  [ ] incontinence  Musculoskeletal:  [ ] myalgias [ ] arthralgias [ ] arthritis  [ ] back pain  Neurological:  [ ] headache [ ] seizures  [ ] confusion/altered mental status  Psychiatric:  [ ] anxiety [ ] depression	  Hematology/Lymphatics:  [ ] lymphadenopathy  Endocrine:  [ ] adrenal [ ] thyroid  Allergic/Immunologic:	 [ ] transplant [ ] seasonal    PHYSICAL EXAM:  Constitutional: Not in acute distress  Eyes: No icterus.  Oral cavity: Clear, no lesions  Neck: Supple  RS: Chest clear   CVS: S1, S2   Abdomen: Soft distended  BS+ min tender LLQ  : no soto  Extremities: Warm. No pedal edema  Skin: No lesions noted  Vascular: No evidence of phlebitis  Neuro: Alert, oriented to time/place/person  Cranial nerves 2-12 grossly normal. No focal abnormalities    Drug Dosing Weight  Height (cm): 121.9 (23 Aug 2023 09:51)  Weight (kg): 79.3 (23 Aug 2023 09:51)  BMI (kg/m2): 53.4 (23 Aug 2023 09:51)  BSA (m2): 1.5 (23 Aug 2023 09:51)    Vital Signs Last 24 Hrs  T(F): 98.4 (08-24-23 @ 16:18), Max: 99 (08-22-23 @ 17:03)    Vital Signs Last 24 Hrs  HR: 89 (08-24-23 @ 16:18) (64 - 89)  BP: 144/65 (08-24-23 @ 16:18) (143/60 - 152/66)  RR: 18 (08-24-23 @ 16:18)  SpO2: 100% (08-24-23 @ 16:18) (100% - 100%)  Wt(kg): --                          11.2   4.96  )-----------( 168      ( 24 Aug 2023 05:33 )             35.3       08-24    141  |  104  |  12  ----------------------------<  135<H>  4.1   |  22  |  0.65    Ca    9.4      24 Aug 2023 05:33  Phos  3.5     08-24  Mg     2.50     08-24        Urinalysis Basic - ( 24 Aug 2023 05:33 )    Color: x / Appearance: x / SG: x / pH: x  Gluc: 135 mg/dL / Ketone: x  / Bili: x / Urobili: x   Blood: x / Protein: x / Nitrite: x   Leuk Esterase: x / RBC: x / WBC x   Sq Epi: x / Non Sq Epi: x / Bacteria: x        MICROBIOLOGY:    Enteroaggregative E. coli (EAEC): Detected (08.22.23 @ 17:47)           RADIOLOGY:    ra< from: CT Abdomen and Pelvis w/ IV Cont (08.21.23 @ 03:38) >    ACC: [id number] EXAM:  CT ABDOMEN AND PELVIS IC   ORDERED BY: EDMUNDO GONZALEZ     PROCEDURE DATE:  08/21/2023          INTERPRETATION:  CLINICAL INFORMATION: Abdominal pain and vomiting    COMPARISON: None.    CONTRAST/COMPLICATIONS:  IV Contrast: Omnipaque 350  90 cc administered   10 cc discarded  Oral Contrast: NONE  Complications: None reported at time of study completion    PROCEDURE:  CT of the Abdomen and Pelvis was performed.  Sagittal and coronal reformats were performed.    FINDINGS:  LOWER CHEST: Calcified granulomata in the right lower lobe    LIVER: Within normal limits.  BILE DUCTS: Normal caliber.  GALLBLADDER: Within normal limits.  SPLEEN: Within normal limits.  PANCREAS: Within normal limits.  ADRENALS: Within normal limits.  KIDNEYS/URETERS: Lobular cortex. Normal enhancement without   hydronephrosis.    BLADDER: Within normal limits.  REPRODUCTIVE ORGANS: Unremarkable CT appearance for age    BOWEL: No bowel obstruction. Appendix is normal.  PERITONEUM: No free air or ascites  VESSELS: Atherosclerotic changes.  RETROPERITONEUM/LYMPH NODES: No lymphadenopathy.  ABDOMINAL WALL: Tiny fat-containing umbilical hernia  BONES: Degenerative changes.    IMPRESSION:  No acute CT abnormality.        --- End of Report ---          GAEL NAVARRO MD; Resident Radiologist  This document has been electronically signed.  KIERRA BOOTH MD; Attending Radiologist  This document has been electronically signed. Aug 21 2023  4:03AM    < end of copied text >  
date of consult 8/21/23    HISTORY OF PRESENT ILLNESS: HPI:     72 year old Female with PMH of non-insulin DM, HTN, and HLD presents c/o 2 episodes of NBNB N/V today. She reports DM meds were adjusted recently and 3 pills were added, unclear of op meds.  Reports epigastric pain, reproducible.      Patient notes that for several months she was having issues with having a "bad feeling" in her abdomen that was a discomfort described more as nausea rather than overt pain. The discomfort was originally sporadic and would only last for a few minutes at a time with complete resolution after each incident so she did not pursue any medical evaluations or interventions for it prior to this visit. However, today the discomfort started at around 11am and has been constant since that time with associated 2 episodes of NBNB vomiting, lightheaded dizziness, shakiness, and pre-syncope prompting current visit. At time of exam patient endorses some improvement of of symptoms but slight residual discomfort. Of note, she did have her DM medications adjusted 3 weeks earlier with new medications added to her previous regimen but her sporadic symptoms were still going on before that point. Denies any other complaints or concerns. Denies chest pain, SOB, cough, fevers, chills, diarrhea, constipation, urinary complaints, HA, numbness, tingling, weakness, sick contacts, recent travel. nausea and vomiting       PAST MEDICAL & SURGICAL HISTORY:  HTN  HLD  DM        MEDICATIONS  (STANDING):  dextrose 5%. 1000 milliLiter(s) (100 mL/Hr) IV Continuous <Continuous>  dextrose 5%. 1000 milliLiter(s) (50 mL/Hr) IV Continuous <Continuous>  dextrose 50% Injectable 25 Gram(s) IV Push once  dextrose 50% Injectable 12.5 Gram(s) IV Push once  dextrose 50% Injectable 25 Gram(s) IV Push once  enoxaparin Injectable 40 milliGRAM(s) SubCutaneous every 24 hours  glucagon  Injectable 1 milliGRAM(s) IntraMuscular once  insulin lispro (ADMELOG) corrective regimen sliding scale   SubCutaneous at bedtime  insulin lispro (ADMELOG) corrective regimen sliding scale   SubCutaneous three times a day before meals  pantoprazole    Tablet 40 milliGRAM(s) Oral before breakfast  sodium chloride 0.9%. 1000 milliLiter(s) (100 mL/Hr) IV Continuous <Continuous>      Allergies  No Known Allergies      FAMILY HISTORY:  Noncontributory for premature coronary disease or sudden cardiac death    SOCIAL HISTORY:    [x ] Non-smoker  [ ] Smoker  [ ] Alcohol    FLU VACCINE THIS YEAR STARTS IN AUGUST:  [ ] Yes    [ ] No    IF OVER 65 HAVE YOU EVER HAD A PNA VACCINE:  [ ] Yes    [ ] No       [ ] N/A      REVIEW OF SYSTEMS:  [ ]chest pain  [  ]shortness of breath  [  ]palpitations  [  ]syncope  [ ]near syncope [ ]upper extremity weakness   [ ] lower extremity weakness  [  ]diplopia  [  ]altered mental status   [  ]fevers  [ ]chills [ ]nausea  [ ]vomiting  [  ]dysphagia    [x ]abdominal pain  [ ]melena  [ ]BRBPR    [  ]epistaxis  [  ]rash    [ ]lower extremity edema        [ x] All others negative	  [ ] Unable to obtain      LABS:	 	    CARDIAC MARKERS:  Trop T 15, 9                            11.6   5.28  )-----------( 188      ( 21 Aug 2023 10:06 )             37.5     142  |  101  |  14  ----------------------------<  145<H>  3.9   |  28  |  0.79    Ca    9.9      21 Aug 2023 10:06  Phos  3.4     08-21  Mg     2.20     08-21    TPro  8.1  /  Alb  4.7  /  TBili  0.3  /  DBili  x   /  AST  36<H>  /  ALT  32  /  AlkPhos  138<H>  08-20    Creatinine Trend: 0.79<--, 0.81<--      PHYSICAL EXAM:  T(C): 36.7 (08-21-23 @ 13:48), Max: 36.8 (08-20-23 @ 19:55)  HR: 77 (08-21-23 @ 13:48) (76 - 110)  BP: 150/74 (08-21-23 @ 13:48) (147/71 - 162/69)  RR: 16 (08-21-23 @ 13:48) (15 - 16)  SpO2: 100% (08-21-23 @ 13:48) (99% - 100%)    Gen: Appears well in NAD  HEENT:  (-)icterus (-)pallor  CV: N S1 S2 1/6 VALERIO (+)2 Pulses B/l  Resp:  Clear to auscultation B/L, normal effort  GI: (+) BS Soft, NT, ND  Lymph:  (-)Edema, (-)obvious lymphadenopathy  Skin: Warm to touch, Normal turgor  Psych: Appropriate mood and affect  	      ECG:  	NSR    < from: CT Abdomen and Pelvis w/ IV Cont (08.21.23 @ 03:38) >    IMPRESSION:  No acute CT abnormality.    < end of copied text >      ASSESSMENT/PLAN: 	72 year old Female with PMH of non-insulin DM, HTN, and HLD presents c/o 2 episodes of NBNB N/V today. She reports DM meds were adjusted recently and 3 pills were added, unclear of op meds.  Reports epigastric pain, reproducible.     1. Abnormal troponin T  --Trop T 15--9, not c/w ACS  --check TTE    2. Abdominal pain  --CT negative as above  --w/u per medicine    3. DM  --per medicine      Chante ESPINOSA  [phone]       
HPI:   73 y/o F with PMH of non-insulin DM, HTN, and HLD presents c/o 2 episodes of NBNB N/V today. Patient notes that for several months she was having issues with having a "bad feeling" in her abdomen that was a discomfort described more as nausea rather than overt pain. The discomfort was originally sporadic and would only last for a few minutes at a time with complete resolution after each incident so she did not pursue any medical evaluations or interventions for it prior to this visit. However, today the discomfort started at around 11am and has been constant since that time with associated 2 episodes of NBNB vomiting, lightheaded dizziness, shakiness, and pre-syncope prompting current visit. At time of exam patient endorses some improvement of of symptoms but slight residual discomfort. Of note, she did have her DM medications adjusted 3 weeks earlier with new medications added to her previous regimen but her sporadic symptoms were still going on before that point. Denies any other complaints or concerns. Denies chest pain, SOB, cough, fevers, chills, diarrhea, constipation, urinary complaints, HA, numbness, tingling, weakness, sick contacts, recent travel. nausea and vomiting  (21 Aug 2023 09:38)    ENDOCRINE CONSULT  HPI:  71 yo F with DM2, HTN, HLD, presents with nausea, vomiting. Endocrine consulted for DM2 management.    Pt was diagnosed with DM2 two years ago. Follows with PCP.   Denies DM complications. She is up to date with ophtho.  Sugars always >200 at home. No hypoglycemia.  Pt reports poor appetite here, eats more at home.  She does not want to take as many pills and would prefer to simplify regimen and use insulin if possible.  No family history of DM.    Endocrinologist: none    Last HbA1c: 9.3%    Home DM regimen:  glipizide 10 mg BID, jardiance 10 mg daily, metformin 1 g bid, pioglitazone 15 mg daily, repaglinide 8 mg bid    Inpatient DM regimen: low dose admelog scales  not requiring insulin here  sugars at goal      PAST MEDICAL & SURGICAL HISTORY: DM2      FAMILY HISTORY: no DM in family      Social History:  no tobacco, etoh or drug use    MEDICATIONS  (STANDING):  amLODIPine   Tablet 5 milliGRAM(s) Oral daily  atorvastatin 20 milliGRAM(s) Oral at bedtime  dextrose 5%. 1000 milliLiter(s) (50 mL/Hr) IV Continuous <Continuous>  dextrose 5%. 1000 milliLiter(s) (100 mL/Hr) IV Continuous <Continuous>  dextrose 50% Injectable 25 Gram(s) IV Push once  dextrose 50% Injectable 25 Gram(s) IV Push once  dextrose 50% Injectable 12.5 Gram(s) IV Push once  enoxaparin Injectable 40 milliGRAM(s) SubCutaneous every 24 hours  glucagon  Injectable 1 milliGRAM(s) IntraMuscular once  insulin lispro (ADMELOG) corrective regimen sliding scale   SubCutaneous at bedtime  insulin lispro (ADMELOG) corrective regimen sliding scale   SubCutaneous three times a day before meals  lisinopril 20 milliGRAM(s) Oral daily  pantoprazole    Tablet 40 milliGRAM(s) Oral before breakfast  polyethylene glycol 3350 17 Gram(s) Oral two times a day  senna 2 Tablet(s) Oral at bedtime  sodium chloride 0.9%. 1000 milliLiter(s) (100 mL/Hr) IV Continuous <Continuous>    MEDICATIONS  (PRN):  dextrose Oral Gel 15 Gram(s) Oral once PRN Blood Glucose LESS THAN 70 milliGRAM(s)/deciliter      Allergies    No Known Allergies    Intolerances        Review of Systems:  Constitutional: No fever, good appetite/po intake  Eyes: No blurry vision, diplopia  Neuro: No tremors  HEENT: No pain  Cardiovascular: No chest pain, palpitations  Respiratory: No SOB, no cough  GI: No nausea, vomiting,   : No dysuria, hematuria  Skin: no rash  Psych: no depression  Endocrine: no polyuria, polydipsia  Hem/lymph: no swelling  Osteoporosis: no fractures    ALL OTHER SYSTEMS REVIEWED AND NEGATIVE    PHYSICAL EXAM:  VITALS: T(C): 36.7 (08-23-23 @ 12:09)  T(F): 98 (08-23-23 @ 12:09), Max: 99 (08-22-23 @ 17:03)  HR: 89 (08-23-23 @ 12:09) (75 - 95)  BP: 154/80 (08-23-23 @ 12:09) (127/41 - 154/80)  RR:  (17 - 18)  SpO2:  (98% - 100%)  Wt(kg): --  GENERAL: NAD, well-groomed, well-developed  EYES: No proptosis, extraocular movements intact,  no lid lag, anicteric  HEENT:  Atraumatic, Normocephalic, moist mucous membranes  THYROID: Normal size, no palpable nodules, no thyromegaly  RESPIRATORY: Clear to auscultation bilaterally; No rales, rhonchi, wheezing, or rubs  CARDIOVASCULAR: Regular rate and rhythm; No murmurs; no peripheral edema  GI: Soft, nontender, non distended, normal bowel sounds  SKIN: Dry, intact, No rashes or lesions  EXTREMITIES: No foot ulcers, distal pedal pulses intact bilaterally  NEURO: sensation intact, no tremors  PSYCH: reactive affect, euthymic mood  CUSHING'S SIGNS: no striae or visible bruising                              11.2   4.85  )-----------( 174      ( 23 Aug 2023 05:22 )             35.6       08-23    140  |  104  |  20  ----------------------------<  128<H>  3.8   |  24  |  0.74    eGFR: 85    Ca    9.4      08-23  Mg     2.20     08-23  Phos  3.4     08-23    TPro  8.1  /  Alb  4.7  /  TBili  0.3  /  DBili  x   /  AST  36<H>  /  ALT  32  /  AlkPhos  138<H>  08-20      Thyroid Function Tests:          Radiology:

## 2023-08-24 NOTE — PROGRESS NOTE ADULT - NS ATTEND AMEND GEN_ALL_CORE FT
Patient seen and examined. Agree with plan as detailed in PA/NP Note.     TTE with presrved LVEF and no RWMA, no further wall motion abnormalties    Suchet Cruz NAQVI  Pager: [phone]
Patient seen and examined. Agree with plan as detailed in PA/NP Note.    F/u echo, unlikely cardiac      Kelly Arroyo MD  Pager: [phone]
Patient seen and examined. Agree with plan as detailed in PA/NP Note.    TTE with preserved LVEF no further cardiac w/u. CP non cardiac in nature    Kelly Arroyo MD  Pager: [phone]

## 2023-08-24 NOTE — DIETITIAN INITIAL EVALUATION ADULT - LITERATURE/VIDEOS GIVEN
Provided pt with handout Carbohydrate Counting for People with Diabetes. Discussed carbohydrate sources, carbohydrate portions, protein sources, mixed meals, and nutrition label reading. Stressed importance of balanced meals with adequate protein and fiber. Pt was informed of current A1c, goal A1c, and goal fingerstick range.

## 2023-08-25 VITALS
DIASTOLIC BLOOD PRESSURE: 54 MMHG | OXYGEN SATURATION: 100 % | RESPIRATION RATE: 18 BRPM | SYSTOLIC BLOOD PRESSURE: 156 MMHG | TEMPERATURE: 98 F | HEART RATE: 83 BPM

## 2023-08-25 LAB
ANION GAP SERPL CALC-SCNC: 11 MMOL/L — SIGNIFICANT CHANGE UP (ref 7–14)
BUN SERPL-MCNC: 19 MG/DL — SIGNIFICANT CHANGE UP (ref 7–23)
CALCIUM SERPL-MCNC: 9.9 MG/DL — SIGNIFICANT CHANGE UP (ref 8.4–10.5)
CHLORIDE SERPL-SCNC: 103 MMOL/L — SIGNIFICANT CHANGE UP (ref 98–107)
CO2 SERPL-SCNC: 27 MMOL/L — SIGNIFICANT CHANGE UP (ref 22–31)
CREAT SERPL-MCNC: 0.86 MG/DL — SIGNIFICANT CHANGE UP (ref 0.5–1.3)
CULTURE RESULTS: SIGNIFICANT CHANGE UP
EGFR: 71 ML/MIN/1.73M2 — SIGNIFICANT CHANGE UP
GLUCOSE BLDC GLUCOMTR-MCNC: 154 MG/DL — HIGH (ref 70–99)
GLUCOSE BLDC GLUCOMTR-MCNC: 163 MG/DL — HIGH (ref 70–99)
GLUCOSE BLDC GLUCOMTR-MCNC: 187 MG/DL — HIGH (ref 70–99)
GLUCOSE SERPL-MCNC: 168 MG/DL — HIGH (ref 70–99)
MAGNESIUM SERPL-MCNC: 2.3 MG/DL — SIGNIFICANT CHANGE UP (ref 1.6–2.6)
PHOSPHATE SERPL-MCNC: 3.5 MG/DL — SIGNIFICANT CHANGE UP (ref 2.5–4.5)
POTASSIUM SERPL-MCNC: 4.2 MMOL/L — SIGNIFICANT CHANGE UP (ref 3.5–5.3)
POTASSIUM SERPL-SCNC: 4.2 MMOL/L — SIGNIFICANT CHANGE UP (ref 3.5–5.3)
SODIUM SERPL-SCNC: 141 MMOL/L — SIGNIFICANT CHANGE UP (ref 135–145)
SPECIMEN SOURCE: SIGNIFICANT CHANGE UP

## 2023-08-25 RX ORDER — EMPAGLIFLOZIN 10 MG/1
1 TABLET, FILM COATED ORAL
Qty: 30 | Refills: 0
Start: 2023-08-25 | End: 2023-09-23

## 2023-08-25 RX ORDER — PIOGLITAZONE HYDROCHLORIDE 15 MG/1
1 TABLET ORAL
Refills: 0 | DISCHARGE

## 2023-08-25 RX ORDER — SENNA PLUS 8.6 MG/1
2 TABLET ORAL
Qty: 0 | Refills: 0 | DISCHARGE
Start: 2023-08-25

## 2023-08-25 RX ORDER — INSULIN GLARGINE 100 [IU]/ML
8 INJECTION, SOLUTION SUBCUTANEOUS
Qty: 1 | Refills: 0
Start: 2023-08-25 | End: 2023-09-23

## 2023-08-25 RX ORDER — LISINOPRIL 2.5 MG/1
1 TABLET ORAL
Qty: 30 | Refills: 0
Start: 2023-08-25 | End: 2023-09-23

## 2023-08-25 RX ORDER — PANTOPRAZOLE SODIUM 20 MG/1
1 TABLET, DELAYED RELEASE ORAL
Qty: 30 | Refills: 0
Start: 2023-08-25 | End: 2023-09-23

## 2023-08-25 RX ORDER — LISINOPRIL/HYDROCHLOROTHIAZIDE 10-12.5 MG
1 TABLET ORAL
Refills: 0 | DISCHARGE

## 2023-08-25 RX ORDER — POLYETHYLENE GLYCOL 3350 17 G/17G
17 POWDER, FOR SOLUTION ORAL
Qty: 0 | Refills: 0 | DISCHARGE
Start: 2023-08-25

## 2023-08-25 RX ORDER — EMPAGLIFLOZIN 10 MG/1
1 TABLET, FILM COATED ORAL
Refills: 0 | DISCHARGE

## 2023-08-25 RX ORDER — REPAGLINIDE 1 MG/1
4 TABLET ORAL
Refills: 0 | DISCHARGE

## 2023-08-25 RX ADMIN — Medication 1: at 17:22

## 2023-08-25 RX ADMIN — Medication 1: at 12:21

## 2023-08-25 RX ADMIN — POLYETHYLENE GLYCOL 3350 17 GRAM(S): 17 POWDER, FOR SOLUTION ORAL at 05:06

## 2023-08-25 RX ADMIN — Medication 1: at 08:14

## 2023-08-25 RX ADMIN — PANTOPRAZOLE SODIUM 40 MILLIGRAM(S): 20 TABLET, DELAYED RELEASE ORAL at 05:07

## 2023-08-25 RX ADMIN — LISINOPRIL 20 MILLIGRAM(S): 2.5 TABLET ORAL at 05:06

## 2023-08-25 RX ADMIN — AMLODIPINE BESYLATE 5 MILLIGRAM(S): 2.5 TABLET ORAL at 05:07

## 2023-08-25 RX ADMIN — POLYETHYLENE GLYCOL 3350 17 GRAM(S): 17 POWDER, FOR SOLUTION ORAL at 17:23

## 2023-08-25 NOTE — DISCHARGE NOTE PROVIDER - NSDCFUADDAPPT_GEN_ALL_CORE_FT
Please follow up with your primary care provider in 1-2 weeks following discharge from the hospital for continued monitoring and management. If you do not have a primary care provider please refer to the information for the internal medicine clinic to establish yourself as a patient.

## 2023-08-25 NOTE — DISCHARGE NOTE NURSING/CASE MANAGEMENT/SOCIAL WORK - PATIENT PORTAL LINK FT
You can access the FollowMyHealth Patient Portal offered by [facility] by registering at the following website: [email or web address]. By joining [facility]’s FollowMyHealth portal, you will also be able to view your health information using other applications (apps) compatible with our system.

## 2023-08-25 NOTE — DISCHARGE NOTE PROVIDER - NSDCMRMEDTOKEN_GEN_ALL_CORE_FT
amLODIPine 5 mg oral tablet: 1 orally once a day  Jardiance 25 mg oral tablet: 1 tab(s) orally once a day  Lantus Solostar Pen 100 units/mL subcutaneous solution: 8 unit(s) subcutaneous once a day (at bedtime)  levocetirizine 5 mg oral tablet: 1 orally once a day  lisinopril 20 mg oral tablet: 1 tab(s) orally once a day  metFORMIN 1000 mg oral tablet: 1 orally 2 times a day  Multiple Vitamins oral tablet: 1 orally once a day  pantoprazole 40 mg oral delayed release tablet: 1 tab(s) orally once a day (before a meal)  polyethylene glycol 3350 oral powder for reconstitution: 17 gram(s) orally 2 times a day  rosuvastatin 20 mg oral tablet: 1 orally once a day (at bedtime)  senna leaf extract oral tablet: 2 tab(s) orally once a day (at bedtime)  Vitamin D 89559J weekly:

## 2023-08-25 NOTE — DISCHARGE NOTE PROVIDER - NSDCCPCAREPLAN_GEN_ALL_CORE_FT
PRINCIPAL DISCHARGE DIAGNOSIS  Diagnosis: Epigastric pain  Assessment and Plan of Treatment: You came in with abdominal pain, this resolved once we adjusted your diabetic medications and you had a bowel movement. Please continue your current medication regiment and follow up with your primary care provider for continued monitoring and care.      SECONDARY DISCHARGE DIAGNOSES  Diagnosis: Type 2 diabetes mellitus  Assessment and Plan of Treatment: - Please STOP glipizide, repaglinide, and pioglitazone  - START Lantus 8u , INCREASE Jardiance to 25mg daily, continue metformin 1000mg twice a day   - Continue a consistent carbohydrate diet (Meaning eating the same amount of carbohydrates at the same time each day). Monitor blood glucose levels throughout the day before meals and at bedtime. Record blood sugars and bring to outpatient providers appointment in order to be reviewed by your doctor for management modifications. Monitor for signs/symptoms of low blood glucose, such as, dizziness, altered mental status, or cool/clammy skin. In addition, monitor for signs/symptoms of high blood glucose, such as, feeling hot, dry, fatigued, or with increased thirst/urination. Make regular podiatry appointments in order to have feet checked for wounds and uncontrolled toe nail growth to prevent infections, as well as, appointments with an ophthalmologist to monitor your vision.    Diagnosis: Hyperlipidemia  Assessment and Plan of Treatment: Continue prescribed medications to control your cholesterol levels and a DASH (Low fat/salt) diet. Follow up with your primary care provider upon discharge for further management and monitoring of cholesterol levels.

## 2023-08-25 NOTE — DISCHARGE NOTE PROVIDER - CARE PROVIDER_API CALL
Landon Glynn)  EndocrinologyMetabDiabetes; Internal Medicine  [address] [place] [address]  Phone: [phone]  Fax: [phone]  Follow Up Time:

## 2023-08-25 NOTE — DISCHARGE NOTE NURSING/CASE MANAGEMENT/SOCIAL WORK - NSDCPEFALRISK_GEN_ALL_CORE
For information on Fall & Injury Prevention, visit: [email or web address] OR  [email or web address] OR  https://www.cdc.gov/steadi/patient.html

## 2023-08-25 NOTE — DISCHARGE NOTE PROVIDER - HOSPITAL COURSE
72F w/ PMHx of of non-insulin DM (A1c - 9.3%), HTN, and HLD p/w abdominal pain with N/V. Patient found to have had DM medications adjusted 3 weeks before presentation. Endocrine consulted - GI distressed improved once oral DM medications changed to insulin. Patient now with new insulin. Additionally, found to have stool PCR + for enteroaggregative E Coli - per ID since no signs of systemic toxicity cleared to monitor off antibiotics. RD reviewed dietary guidelines, RN completed DM education.     On 8/25/23, discussed with Dr. Arroyo, patient is medically cleared and optimized for discharge today. All medications were reviewed with attending, and sent to mutually agreed upon pharmacy.  Reviewed discharge medications with patient; All new medications requiring new prescription sent to pharmacy of patients choice. Reviewed need for prescription for previous home medications and new prescriptions sent if requested. Patient in agreement and understands.

## 2023-08-25 NOTE — PROGRESS NOTE ADULT - SUBJECTIVE AND OBJECTIVE BOX
Date of service 8/25/23    chief complaint: abdominal pain    extended hpi: 72 year old Female with PMH of non-insulin DM, HTN, and HLD presents c/o 2 episodes of NBNB N/V today. She reports DM meds were adjusted recently and 3 pills were added, unclear of op meds.  Reports epigastric pain, reproducible.       DATE OF SERVICE: 08-25-23    Patient denies chest pain or shortness of breath.   Review of symptoms otherwise negative.    MEDICATIONS:  amLODIPine   Tablet 5 milliGRAM(s) Oral daily  atorvastatin 20 milliGRAM(s) Oral at bedtime  dextrose 5%. 1000 milliLiter(s) IV Continuous <Continuous>  dextrose 5%. 1000 milliLiter(s) IV Continuous <Continuous>  dextrose 50% Injectable 25 Gram(s) IV Push once  dextrose 50% Injectable 25 Gram(s) IV Push once  dextrose 50% Injectable 12.5 Gram(s) IV Push once  dextrose Oral Gel 15 Gram(s) Oral once PRN  enoxaparin Injectable 40 milliGRAM(s) SubCutaneous every 24 hours  glucagon  Injectable 1 milliGRAM(s) IntraMuscular once  insulin lispro (ADMELOG) corrective regimen sliding scale   SubCutaneous at bedtime  insulin lispro (ADMELOG) corrective regimen sliding scale   SubCutaneous three times a day before meals  lisinopril 20 milliGRAM(s) Oral daily  pantoprazole    Tablet 40 milliGRAM(s) Oral before breakfast  polyethylene glycol 3350 17 Gram(s) Oral two times a day  senna 2 Tablet(s) Oral at bedtime  sodium chloride 0.9%. 1000 milliLiter(s) IV Continuous <Continuous>      LABS:                        11.2   4.96  )-----------( 168      ( 24 Aug 2023 05:33 )             35.3       Hemoglobin: 11.2 g/dL (08-24 @ 05:33)  Hemoglobin: 11.2 g/dL (08-23 @ 05:22)  Hemoglobin: 10.9 g/dL (08-22 @ 05:53)  Hemoglobin: 11.6 g/dL (08-21 @ 10:06)  Hemoglobin: 11.8 g/dL (08-20 @ 22:14)      08-25    141  |  103  |  19  ----------------------------<  168<H>  4.2   |  27  |  0.86    Ca    9.9      25 Aug 2023 05:21  Phos  3.5     08-25  Mg     2.30     08-25      Creatinine Trend: 0.86<--, 0.65<--, 0.74<--, 0.75<--, 0.79<--, 0.81<--    COAGS:           PHYSICAL EXAM:  T(C): 36.7 (08-25-23 @ 13:19), Max: 37.2 (08-24-23 @ 20:12)  HR: 92 (08-25-23 @ 13:19) (80 - 92)  BP: 137/64 (08-25-23 @ 13:19) (137/64 - 153/78)  RR: 18 (08-25-23 @ 13:19) (18 - 18)  SpO2: 100% (08-25-23 @ 13:19) (100% - 100%)  Wt(kg): --    I&O's Summary    24 Aug 2023 07:01  -  25 Aug 2023 07:00  --------------------------------------------------------  IN: 610 mL / OUT: 0 mL / NET: 610 mL        General:  Alert and Oriented * 3.   Head: Normocephalic and atraumatic.   Neck: No JVD. No bruits. Supple. Does not appear to be enlarged.   Cardiovascular: + S1,S2 ; RRR Soft systolic murmur at the left lower sternal border. No rubs noted.    Lungs: CTA b/l. No rhonchi, rales or wheezes.   Abdomen: + BS, soft. Non tender. Non distended. No rebound. No guarding.   Extremities: No clubbing/cyanosis/edema.   Neurologic: Moves all four extremities. Full range of motion.   Skin: Warm and moist. The patient's skin has normal elasticity and good skin turgor.   Psychiatric: Appropriate mood and affect.  Musculoskeletal: Normal range of motion, normal strength    DATA    tele- SR    ECG: NSR    < from: CT Abdomen and Pelvis w/ IV Cont (08.21.23 @ 03:38) >    IMPRESSION:  No acute CT abnormality.    < end of copied text >      ASSESSMENT/PLAN: 	72 year old Female with PMH of non-insulin DM, HTN, and HLD presents c/o 2 episodes of NBNB N/V today. She reports DM meds were adjusted recently and 3 pills were added, unclear of op meds.  Reports epigastric pain, reproducible.     1. Abnormal troponin T  --Trop T 15--9, not c/w ACS  --TTE with structurally normal heart  --no further inpatient cardiac w/u planned    2. Abdominal pain  --CT negative as above  --pain resolved after having a BM  --Rx per medicine    3. DM  --management per medicine    OP F/U with [facility] after DC    Kelly Arroyo MD  Pager: [phone]

## 2024-02-27 NOTE — PROGRESS NOTE ADULT - SUBJECTIVE AND OBJECTIVE BOX
Dapsone Counseling: I discussed with the patient the risks of dapsone including but not limited to hemolytic anemia, agranulocytosis, rashes, methemoglobinemia, kidney failure, peripheral neuropathy, headaches, GI upset, and liver toxicity.  Patients who start dapsone require monitoring including baseline LFTs and weekly CBCs for the first month, then every month thereafter.  The patient verbalized understanding of the proper use and possible adverse effects of dapsone.  All of the patient's questions and concerns were addressed. Patient is a 72y old  Female who presents with a chief complaint of   Date of servie : 08-22-23 @ 14:32  INTERVAL HPI/OVERNIGHT EVENTS:  T(C): 37 (08-22-23 @ 12:31), Max: 37 (08-22-23 @ 12:31)  HR: 82 (08-22-23 @ 12:31) (74 - 82)  BP: 127/88 (08-22-23 @ 12:31) (120/58 - 154/68)  RR: 18 (08-22-23 @ 12:31) (17 - 18)  SpO2: 99% (08-22-23 @ 12:31) (99% - 100%)  Wt(kg): --  I&O's Summary      LABS:                        10.9   4.43  )-----------( 167      ( 22 Aug 2023 05:53 )             34.7     08-22    139  |  104  |  17  ----------------------------<  142<H>  3.7   |  23  |  0.75    Ca    9.1      22 Aug 2023 05:53  Phos  3.4     08-21  Mg     2.20     08-21    TPro  8.1  /  Alb  4.7  /  TBili  0.3  /  DBili  x   /  AST  36<H>  /  ALT  32  /  AlkPhos  138<H>  08-20      Urinalysis Basic - ( 22 Aug 2023 05:53 )    Color: x / Appearance: x / SG: x / pH: x  Gluc: 142 mg/dL / Ketone: x  / Bili: x / Urobili: x   Blood: x / Protein: x / Nitrite: x   Leuk Esterase: x / RBC: x / WBC x   Sq Epi: x / Non Sq Epi: x / Bacteria: x      CAPILLARY BLOOD GLUCOSE      POCT Blood Glucose.: 135 mg/dL (22 Aug 2023 11:55)  POCT Blood Glucose.: 125 mg/dL (22 Aug 2023 08:05)  POCT Blood Glucose.: 132 mg/dL (22 Aug 2023 00:00)  POCT Blood Glucose.: 96 mg/dL (21 Aug 2023 23:36)  POCT Blood Glucose.: 91 mg/dL (21 Aug 2023 23:34)  POCT Blood Glucose.: 99 mg/dL (21 Aug 2023 23:13)  POCT Blood Glucose.: 159 mg/dL (21 Aug 2023 15:11)        Urinalysis Basic - ( 22 Aug 2023 05:53 )    Color: x / Appearance: x / SG: x / pH: x  Gluc: 142 mg/dL / Ketone: x  / Bili: x / Urobili: x   Blood: x / Protein: x / Nitrite: x   Leuk Esterase: x / RBC: x / WBC x   Sq Epi: x / Non Sq Epi: x / Bacteria: x        MEDICATIONS  (STANDING):  dextrose 5%. 1000 milliLiter(s) (100 mL/Hr) IV Continuous <Continuous>  dextrose 5%. 1000 milliLiter(s) (50 mL/Hr) IV Continuous <Continuous>  dextrose 50% Injectable 25 Gram(s) IV Push once  dextrose 50% Injectable 25 Gram(s) IV Push once  dextrose 50% Injectable 12.5 Gram(s) IV Push once  enoxaparin Injectable 40 milliGRAM(s) SubCutaneous every 24 hours  glucagon  Injectable 1 milliGRAM(s) IntraMuscular once  insulin lispro (ADMELOG) corrective regimen sliding scale   SubCutaneous at bedtime  insulin lispro (ADMELOG) corrective regimen sliding scale   SubCutaneous three times a day before meals  pantoprazole    Tablet 40 milliGRAM(s) Oral before breakfast  polyethylene glycol 3350 17 Gram(s) Oral two times a day  senna 2 Tablet(s) Oral at bedtime  sodium chloride 0.9%. 1000 milliLiter(s) (100 mL/Hr) IV Continuous <Continuous>    MEDICATIONS  (PRN):  dextrose Oral Gel 15 Gram(s) Oral once PRN Blood Glucose LESS THAN 70 milliGRAM(s)/deciliter          PHYSICAL EXAM:  GENERAL: NAD, well-groomed, well-developed  HEAD:  Atraumatic, Normocephalic  CHEST/LUNG: Clear to percussion bilaterally; No rales, rhonchi, wheezing, or rubs  HEART: Regular rate and rhythm; No murmurs, rubs, or gallops  ABDOMEN: Soft, Nontender, Nondistended; Bowel sounds present  EXTREMITIES:  2+ Peripheral Pulses, No clubbing, cyanosis, or edema  LYMPH: No lymphadenopathy noted  SKIN: No rashes or lesions    Care Discussed with Consultants/Other Providers [ ] YES  [ ] NO Isotretinoin Pregnancy And Lactation Text: This medication is Pregnancy Category X and is considered extremely dangerous during pregnancy. It is unknown if it is excreted in breast milk. Tetracycline Counseling: Patient counseled regarding possible photosensitivity and increased risk for sunburn.  Patient instructed to avoid sunlight, if possible.  When exposed to sunlight, patients should wear protective clothing, sunglasses, and sunscreen.  The patient was instructed to call the office immediately if the following severe adverse effects occur:  hearing changes, easy bruising/bleeding, severe headache, or vision changes.  The patient verbalized understanding of the proper use and possible adverse effects of tetracycline.  All of the patient's questions and concerns were addressed. Patient understands to avoid pregnancy while on therapy due to potential birth defects. Doxycycline Counseling:  Patient counseled regarding possible photosensitivity and increased risk for sunburn.  Patient instructed to avoid sunlight, if possible.  When exposed to sunlight, patients should wear protective clothing, sunglasses, and sunscreen.  The patient was instructed to call the office immediately if the following severe adverse effects occur:  hearing changes, easy bruising/bleeding, severe headache, or vision changes.  The patient verbalized understanding of the proper use and possible adverse effects of doxycycline.  All of the patient's questions and concerns were addressed. Topical Retinoid Pregnancy And Lactation Text: This medication is Pregnancy Category C. It is unknown if this medication is excreted in breast milk. Spironolactone Counseling: Patient advised regarding risks of diarrhea, abdominal pain, hyperkalemia, birth defects (for female patients), liver toxicity and renal toxicity. The patient may need blood work to monitor liver and kidney function and potassium levels while on therapy. The patient verbalized understanding of the proper use and possible adverse effects of spironolactone.  All of the patient's questions and concerns were addressed. Benzoyl Peroxide Pregnancy And Lactation Text: This medication is Pregnancy Category C. It is unknown if benzoyl peroxide is excreted in breast milk. Azithromycin Pregnancy And Lactation Text: This medication is considered safe during pregnancy and is also secreted in breast milk. Winlevi Counseling:  I discussed with the patient the risks of topical clascoterone including but not limited to erythema, scaling, itching, and stinging. Patient voiced their understanding. Azelaic Acid Pregnancy And Lactation Text: This medication is considered safe during pregnancy and breast feeding. Topical Sulfur Applications Counseling: Topical Sulfur Counseling: Patient counseled that this medication may cause skin irritation or allergic reactions.  In the event of skin irritation, the patient was advised to reduce the amount of the drug applied or use it less frequently.   The patient verbalized understanding of the proper use and possible adverse effects of topical sulfur application.  All of the patient's questions and concerns were addressed. Erythromycin Counseling:  I discussed with the patient the risks of erythromycin including but not limited to GI upset, allergic reaction, drug rash, diarrhea, increase in liver enzymes, and yeast infections. Sarecycline Counseling: Patient advised regarding possible photosensitivity and discoloration of the teeth, skin, lips, tongue and gums.  Patient instructed to avoid sunlight, if possible.  When exposed to sunlight, patients should wear protective clothing, sunglasses, and sunscreen.  The patient was instructed to call the office immediately if the following severe adverse effects occur:  hearing changes, easy bruising/bleeding, severe headache, or vision changes.  The patient verbalized understanding of the proper use and possible adverse effects of sarecycline.  All of the patient's questions and concerns were addressed. Bactrim Pregnancy And Lactation Text: This medication is Pregnancy Category D and is known to cause fetal risk.  It is also excreted in breast milk. Minocycline Counseling: Patient advised regarding possible photosensitivity and discoloration of the teeth, skin, lips, tongue and gums.  Patient instructed to avoid sunlight, if possible.  When exposed to sunlight, patients should wear protective clothing, sunglasses, and sunscreen.  The patient was instructed to call the office immediately if the following severe adverse effects occur:  hearing changes, easy bruising/bleeding, severe headache, or vision changes.  The patient verbalized understanding of the proper use and possible adverse effects of minocycline.  All of the patient's questions and concerns were addressed. Include Pregnancy/Lactation Warning?: No Aklief Pregnancy And Lactation Text: It is unknown if this medication is safe to use during pregnancy.  It is unknown if this medication is excreted in breast milk.  Breastfeeding women should use the topical cream on the smallest area of the skin for the shortest time needed while breastfeeding.  Do not apply to nipple and areola. Birth Control Pills Pregnancy And Lactation Text: This medication should be avoided if pregnant and for the first 30 days post-partum. Topical Clindamycin Counseling: Patient counseled that this medication may cause skin irritation or allergic reactions.  In the event of skin irritation, the patient was advised to reduce the amount of the drug applied or use it less frequently.   The patient verbalized understanding of the proper use and possible adverse effects of clindamycin.  All of the patient's questions and concerns were addressed. High Dose Vitamin A Counseling: Side effects reviewed, pt to contact office should one occur. Tetracycline Pregnancy And Lactation Text: This medication is Pregnancy Category D and not consider safe during pregnancy. It is also excreted in breast milk. Tazorac Counseling:  Patient advised that medication is irritating and drying.  Patient may need to apply sparingly and wash off after an hour before eventually leaving it on overnight.  The patient verbalized understanding of the proper use and possible adverse effects of tazorac.  All of the patient's questions and concerns were addressed. Detail Level: Detailed Spironolactone Pregnancy And Lactation Text: This medication can cause feminization of the male fetus and should be avoided during pregnancy. The active metabolite is also found in breast milk. Topical Retinoid counseling:  Patient advised to apply a pea-sized amount only at bedtime and wait 30 minutes after washing their face before applying.  If too drying, patient may add a non-comedogenic moisturizer. The patient verbalized understanding of the proper use and possible adverse effects of retinoids.  All of the patient's questions and concerns were addressed. Dapsone Pregnancy And Lactation Text: This medication is Pregnancy Category C and is not considered safe during pregnancy or breast feeding. Winlevi Pregnancy And Lactation Text: This medication is considered safe during pregnancy and breastfeeding. Isotretinoin Counseling: Patient should get monthly blood tests, not donate blood, not drive at night if vision affected, not share medication, and not undergo elective surgery for 6 months after tx completed. Side effects reviewed, pt to contact office should one occur. Doxycycline Pregnancy And Lactation Text: This medication is Pregnancy Category D and not consider safe during pregnancy. It is also excreted in breast milk but is considered safe for shorter treatment courses. Azithromycin Counseling:  I discussed with the patient the risks of azithromycin including but not limited to GI upset, allergic reaction, drug rash, diarrhea, and yeast infections. Erythromycin Pregnancy And Lactation Text: This medication is Pregnancy Category B and is considered safe during pregnancy. It is also excreted in breast milk. Benzoyl Peroxide Counseling: Patient counseled that medicine may cause skin irritation and bleach clothing.  In the event of skin irritation, the patient was advised to reduce the amount of the drug applied or use it less frequently.   The patient verbalized understanding of the proper use and possible adverse effects of benzoyl peroxide.  All of the patient's questions and concerns were addressed. Bactrim Counseling:  I discussed with the patient the risks of sulfa antibiotics including but not limited to GI upset, allergic reaction, drug rash, diarrhea, dizziness, photosensitivity, and yeast infections.  Rarely, more serious reactions can occur including but not limited to aplastic anemia, agranulocytosis, methemoglobinemia, blood dyscrasias, liver or kidney failure, lung infiltrates or desquamative/blistering drug rashes. Topical Sulfur Applications Pregnancy And Lactation Text: This medication is Pregnancy Category C and has an unknown safety profile during pregnancy. It is unknown if this topical medication is excreted in breast milk. Azelaic Acid Counseling: Patient counseled that medicine may cause skin irritation and to avoid applying near the eyes.  In the event of skin irritation, the patient was advised to reduce the amount of the drug applied or use it less frequently.   The patient verbalized understanding of the proper use and possible adverse effects of azelaic acid.  All of the patient's questions and concerns were addressed. Topical Clindamycin Pregnancy And Lactation Text: This medication is Pregnancy Category B and is considered safe during pregnancy. It is unknown if it is excreted in breast milk. Aklief counseling:  Patient advised to apply a pea-sized amount only at bedtime and wait 30 minutes after washing their face before applying.  If too drying, patient may add a non-comedogenic moisturizer.  The most commonly reported side effects including irritation, redness, scaling, dryness, stinging, burning, itching, and increased risk of sunburn.  The patient verbalized understanding of the proper use and possible adverse effects of retinoids.  All of the patient's questions and concerns were addressed. Tazorac Pregnancy And Lactation Text: This medication is not safe during pregnancy. It is unknown if this medication is excreted in breast milk. Birth Control Pills Counseling: Birth Control Pill Counseling: I discussed with the patient the potential side effects of OCPs including but not limited to increased risk of stroke, heart attack, thrombophlebitis, deep venous thrombosis, hepatic adenomas, breast changes, GI upset, headaches, and depression.  The patient verbalized understanding of the proper use and possible adverse effects of OCPs. All of the patient's questions and concerns were addressed. High Dose Vitamin A Pregnancy And Lactation Text: High dose vitamin A therapy is contraindicated during pregnancy and breast feeding.